# Patient Record
Sex: FEMALE | Race: WHITE | NOT HISPANIC OR LATINO | URBAN - METROPOLITAN AREA
[De-identification: names, ages, dates, MRNs, and addresses within clinical notes are randomized per-mention and may not be internally consistent; named-entity substitution may affect disease eponyms.]

---

## 2017-04-25 ENCOUNTER — INPATIENT (INPATIENT)
Facility: HOSPITAL | Age: 82
LOS: 3 days | Discharge: NURSING, REHAB OR LONG-TERM CARE | DRG: 261 | End: 2017-04-29
Attending: HOSPITALIST | Admitting: FAMILY MEDICINE
Payer: MEDICARE

## 2017-04-25 VITALS
HEART RATE: 80 BPM | HEIGHT: 62 IN | DIASTOLIC BLOOD PRESSURE: 65 MMHG | TEMPERATURE: 98 F | WEIGHT: 160.06 LBS | OXYGEN SATURATION: 100 % | SYSTOLIC BLOOD PRESSURE: 126 MMHG | RESPIRATION RATE: 18 BRPM

## 2017-04-25 DIAGNOSIS — I10 ESSENTIAL (PRIMARY) HYPERTENSION: ICD-10-CM

## 2017-04-25 DIAGNOSIS — I48.91 UNSPECIFIED ATRIAL FIBRILLATION: ICD-10-CM

## 2017-04-25 DIAGNOSIS — R55 SYNCOPE AND COLLAPSE: ICD-10-CM

## 2017-04-25 DIAGNOSIS — E11.9 TYPE 2 DIABETES MELLITUS WITHOUT COMPLICATIONS: ICD-10-CM

## 2017-04-25 LAB
ALBUMIN SERPL ELPH-MCNC: 3.7 G/DL — SIGNIFICANT CHANGE UP (ref 3.3–5.2)
ALP SERPL-CCNC: 76 U/L — SIGNIFICANT CHANGE UP (ref 40–120)
ALT FLD-CCNC: 59 U/L — HIGH
ANION GAP SERPL CALC-SCNC: 14 MMOL/L — SIGNIFICANT CHANGE UP (ref 5–17)
APPEARANCE UR: ABNORMAL
APTT BLD: 29.9 SEC — SIGNIFICANT CHANGE UP (ref 27.5–37.4)
AST SERPL-CCNC: 67 U/L — HIGH
BACTERIA # UR AUTO: ABNORMAL
BASOPHILS # BLD AUTO: 0 K/UL — SIGNIFICANT CHANGE UP (ref 0–0.2)
BASOPHILS NFR BLD AUTO: 0.3 % — SIGNIFICANT CHANGE UP (ref 0–2)
BILIRUB SERPL-MCNC: 0.8 MG/DL — SIGNIFICANT CHANGE UP (ref 0.4–2)
BILIRUB UR-MCNC: NEGATIVE — SIGNIFICANT CHANGE UP
BUN SERPL-MCNC: 25 MG/DL — HIGH (ref 8–20)
CALCIUM SERPL-MCNC: 10.8 MG/DL — HIGH (ref 8.6–10.2)
CHLORIDE SERPL-SCNC: 98 MMOL/L — SIGNIFICANT CHANGE UP (ref 98–107)
CK SERPL-CCNC: 62 U/L — SIGNIFICANT CHANGE UP (ref 25–170)
CO2 SERPL-SCNC: 27 MMOL/L — SIGNIFICANT CHANGE UP (ref 22–29)
COLOR SPEC: YELLOW — SIGNIFICANT CHANGE UP
CREAT SERPL-MCNC: 1.49 MG/DL — HIGH (ref 0.5–1.3)
DIFF PNL FLD: ABNORMAL
EOSINOPHIL # BLD AUTO: 0 K/UL — SIGNIFICANT CHANGE UP (ref 0–0.5)
EOSINOPHIL NFR BLD AUTO: 0.2 % — SIGNIFICANT CHANGE UP (ref 0–6)
EPI CELLS # UR: SIGNIFICANT CHANGE UP
GLUCOSE SERPL-MCNC: 170 MG/DL — HIGH (ref 70–115)
GLUCOSE UR QL: NEGATIVE MG/DL — SIGNIFICANT CHANGE UP
HCT VFR BLD CALC: 42.2 % — SIGNIFICANT CHANGE UP (ref 37–47)
HGB BLD-MCNC: 14.6 G/DL — SIGNIFICANT CHANGE UP (ref 12–16)
INR BLD: 1.01 RATIO — SIGNIFICANT CHANGE UP (ref 0.88–1.16)
KETONES UR-MCNC: NEGATIVE — SIGNIFICANT CHANGE UP
LEUKOCYTE ESTERASE UR-ACNC: ABNORMAL
LYMPHOCYTES # BLD AUTO: 1.3 K/UL — SIGNIFICANT CHANGE UP (ref 1–4.8)
LYMPHOCYTES # BLD AUTO: 9.7 % — LOW (ref 20–55)
MCHC RBC-ENTMCNC: 31.4 PG — HIGH (ref 27–31)
MCHC RBC-ENTMCNC: 34.6 G/DL — SIGNIFICANT CHANGE UP (ref 32–36)
MCV RBC AUTO: 90.8 FL — SIGNIFICANT CHANGE UP (ref 81–99)
MONOCYTES # BLD AUTO: 1.5 K/UL — HIGH (ref 0–0.8)
MONOCYTES NFR BLD AUTO: 11.1 % — HIGH (ref 3–10)
NEUTROPHILS # BLD AUTO: 10.4 K/UL — HIGH (ref 1.8–8)
NEUTROPHILS NFR BLD AUTO: 78.5 % — HIGH (ref 37–73)
NITRITE UR-MCNC: NEGATIVE — SIGNIFICANT CHANGE UP
PH UR: 6 — SIGNIFICANT CHANGE UP (ref 5–8)
PLATELET # BLD AUTO: 208 K/UL — SIGNIFICANT CHANGE UP (ref 150–400)
POTASSIUM SERPL-MCNC: 4.8 MMOL/L — SIGNIFICANT CHANGE UP (ref 3.5–5.3)
POTASSIUM SERPL-SCNC: 4.8 MMOL/L — SIGNIFICANT CHANGE UP (ref 3.5–5.3)
PROT SERPL-MCNC: 7.1 G/DL — SIGNIFICANT CHANGE UP (ref 6.6–8.7)
PROT UR-MCNC: 500 MG/DL
PROTHROM AB SERPL-ACNC: 11.1 SEC — SIGNIFICANT CHANGE UP (ref 9.8–12.7)
RBC # BLD: 4.65 M/UL — SIGNIFICANT CHANGE UP (ref 4.4–5.2)
RBC # FLD: 14.1 % — SIGNIFICANT CHANGE UP (ref 11–15.6)
RBC CASTS # UR COMP ASSIST: SIGNIFICANT CHANGE UP /HPF (ref 0–4)
SODIUM SERPL-SCNC: 139 MMOL/L — SIGNIFICANT CHANGE UP (ref 135–145)
SP GR SPEC: 1.01 — SIGNIFICANT CHANGE UP (ref 1.01–1.02)
TROPONIN T SERPL-MCNC: <0.01 NG/ML — SIGNIFICANT CHANGE UP (ref 0–0.06)
UROBILINOGEN FLD QL: NEGATIVE MG/DL — SIGNIFICANT CHANGE UP
WBC # BLD: 13.2 K/UL — HIGH (ref 4.8–10.8)
WBC # FLD AUTO: 13.2 K/UL — HIGH (ref 4.8–10.8)
WBC UR QL: ABNORMAL

## 2017-04-25 PROCEDURE — 99222 1ST HOSP IP/OBS MODERATE 55: CPT

## 2017-04-25 PROCEDURE — 71010: CPT | Mod: 26

## 2017-04-25 PROCEDURE — 70450 CT HEAD/BRAIN W/O DYE: CPT | Mod: 26

## 2017-04-25 PROCEDURE — 99285 EMERGENCY DEPT VISIT HI MDM: CPT

## 2017-04-25 PROCEDURE — 93010 ELECTROCARDIOGRAM REPORT: CPT

## 2017-04-25 RX ORDER — VALACYCLOVIR 500 MG/1
1000 TABLET, FILM COATED ORAL EVERY 8 HOURS
Qty: 0 | Refills: 0 | Status: DISCONTINUED | OUTPATIENT
Start: 2017-04-25 | End: 2017-04-27

## 2017-04-25 RX ORDER — DOCUSATE SODIUM 100 MG
100 CAPSULE ORAL THREE TIMES A DAY
Qty: 0 | Refills: 0 | Status: DISCONTINUED | OUTPATIENT
Start: 2017-04-25 | End: 2017-04-29

## 2017-04-25 RX ORDER — HEPARIN SODIUM 5000 [USP'U]/ML
5000 INJECTION INTRAVENOUS; SUBCUTANEOUS EVERY 12 HOURS
Qty: 0 | Refills: 0 | Status: DISCONTINUED | OUTPATIENT
Start: 2017-04-25 | End: 2017-04-29

## 2017-04-25 RX ORDER — INSULIN LISPRO 100/ML
VIAL (ML) SUBCUTANEOUS
Qty: 0 | Refills: 0 | Status: DISCONTINUED | OUTPATIENT
Start: 2017-04-25 | End: 2017-04-29

## 2017-04-25 RX ORDER — DEXTROSE 50 % IN WATER 50 %
12.5 SYRINGE (ML) INTRAVENOUS ONCE
Qty: 0 | Refills: 0 | Status: DISCONTINUED | OUTPATIENT
Start: 2017-04-25 | End: 2017-04-29

## 2017-04-25 RX ORDER — DIGOXIN 250 MCG
0.12 TABLET ORAL DAILY
Qty: 0 | Refills: 0 | Status: DISCONTINUED | OUTPATIENT
Start: 2017-04-25 | End: 2017-04-29

## 2017-04-25 RX ORDER — LOSARTAN POTASSIUM 100 MG/1
12.5 TABLET, FILM COATED ORAL DAILY
Qty: 0 | Refills: 0 | Status: DISCONTINUED | OUTPATIENT
Start: 2017-04-25 | End: 2017-04-27

## 2017-04-25 RX ORDER — GABAPENTIN 400 MG/1
300 CAPSULE ORAL THREE TIMES A DAY
Qty: 0 | Refills: 0 | Status: DISCONTINUED | OUTPATIENT
Start: 2017-04-25 | End: 2017-04-29

## 2017-04-25 RX ORDER — METOPROLOL TARTRATE 50 MG
12.5 TABLET ORAL DAILY
Qty: 0 | Refills: 0 | Status: DISCONTINUED | OUTPATIENT
Start: 2017-04-25 | End: 2017-04-29

## 2017-04-25 RX ORDER — SODIUM CHLORIDE 9 MG/ML
1000 INJECTION, SOLUTION INTRAVENOUS
Qty: 0 | Refills: 0 | Status: DISCONTINUED | OUTPATIENT
Start: 2017-04-25 | End: 2017-04-29

## 2017-04-25 RX ORDER — ASPIRIN/CALCIUM CARB/MAGNESIUM 324 MG
81 TABLET ORAL DAILY
Qty: 0 | Refills: 0 | Status: DISCONTINUED | OUTPATIENT
Start: 2017-04-25 | End: 2017-04-29

## 2017-04-25 RX ORDER — ONDANSETRON 8 MG/1
4 TABLET, FILM COATED ORAL EVERY 6 HOURS
Qty: 0 | Refills: 0 | Status: DISCONTINUED | OUTPATIENT
Start: 2017-04-25 | End: 2017-04-29

## 2017-04-25 RX ORDER — DEXTROSE 50 % IN WATER 50 %
1 SYRINGE (ML) INTRAVENOUS ONCE
Qty: 0 | Refills: 0 | Status: DISCONTINUED | OUTPATIENT
Start: 2017-04-25 | End: 2017-04-29

## 2017-04-25 RX ORDER — GLUCAGON INJECTION, SOLUTION 0.5 MG/.1ML
1 INJECTION, SOLUTION SUBCUTANEOUS ONCE
Qty: 0 | Refills: 0 | Status: DISCONTINUED | OUTPATIENT
Start: 2017-04-25 | End: 2017-04-29

## 2017-04-25 NOTE — H&P ADULT - NEGATIVE NEUROLOGICAL SYMPTOMS
no paresthesias/no loss of sensation/no transient paralysis/no loss of consciousness/no difficulty walking/no tremors/no focal seizures/no headache/no generalized seizures

## 2017-04-25 NOTE — ED PROVIDER NOTE - NS ED MD SCRIBE ATTENDING SCRIBE SECTIONS
PAST MEDICAL/SURGICAL/SOCIAL HISTORY/RESULTS/HISTORY OF PRESENT ILLNESS/VITAL SIGNS( Pullset)/REVIEW OF SYSTEMS/PHYSICAL EXAM

## 2017-04-25 NOTE — ED ADULT NURSE NOTE - PMH
Atrial fibrillation    HTN (hypertension) Atrial fibrillation    CVA (cerebral vascular accident)  left sided weakness  HTN (hypertension)

## 2017-04-25 NOTE — H&P ADULT - HISTORY OF PRESENT ILLNESS
Patient is 84 yo female with hx of Atrial fib S/P lariat procedure, and CVA presenting with dizziness, and near syncopal episodes.  Patient reports that she went to the bathroom to have BM when she started to feel dizzy, and experienced generalized weakness which resolved after several Minutes.     Offers no other complaints

## 2017-04-25 NOTE — ED PROVIDER NOTE - OBJECTIVE STATEMENT
An 85 year old female pt presents to the ED c/o weakness, dizziness. The pt was attempting to make a BM when she became dizzy, weak, and experienced a possible syncopal episode that lasted a few seconds. Pt did not have CP or SOB during this episode. 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 Pt takes Aspirin qd. She was recently diagnosed with shingles and placed on Valtrex. Her PMD is Dr. Alexander and her cardiologist is Dr. Kruger. No further complaints at this time.

## 2017-04-25 NOTE — H&P ADULT - PMH
Atrial fibrillation  s/p lariat  CVA (cerebral vascular accident)  left sided weakness  DM2 (diabetes mellitus, type 2)    HTN (hypertension)    Neuropathy

## 2017-04-25 NOTE — H&P ADULT - ATTENDING COMMENTS
Plan of care was discussed with patient, and son in great details, All questions were answered to their satisfication.  Seems to understand, and in agreement

## 2017-04-25 NOTE — ED PROVIDER NOTE - MEDICAL DECISION MAKING DETAILS
An 85 year old female pt presents to the ED c/o dizziness, weakness, syncope. Will check labs, CT head, contact cardiology.

## 2017-04-25 NOTE — ED ADULT NURSE NOTE - OBJECTIVE STATEMENT
Patient A&O to person, place, year. Denies any pain or discomfort. Son at bedside stated patient ambulated to bathroom with walker, was unable to get up from toilet on own, began complaining of weakness & stated "it seemed as if she passed out or was going to pass out." Respirations even & unlabored. Cardiac monitor in place, a-fib. Full ROM x4. Family at bedside, will monitor.

## 2017-04-25 NOTE — H&P ADULT - PROBLEM SELECTOR PLAN 1
Unclear Etiology.  Will obtain EKG, serial trop, TTE, Carotid doppler US, cardiology consult, neurology consult, and neuro checks

## 2017-04-25 NOTE — ED ADULT TRIAGE NOTE - CHIEF COMPLAINT QUOTE
pt bib ems c/o sudden onset of AMS within last half hour witnessed by family.  by EMS  as per EMS pt GCS 14. pt now GCS 15. as per EMS pt had "questionable" elevations on EKG.

## 2017-04-26 DIAGNOSIS — N28.9 DISORDER OF KIDNEY AND URETER, UNSPECIFIED: ICD-10-CM

## 2017-04-26 DIAGNOSIS — R55 SYNCOPE AND COLLAPSE: ICD-10-CM

## 2017-04-26 DIAGNOSIS — R53.1 WEAKNESS: ICD-10-CM

## 2017-04-26 LAB
ANION GAP SERPL CALC-SCNC: 15 MMOL/L — SIGNIFICANT CHANGE UP (ref 5–17)
BUN SERPL-MCNC: 26 MG/DL — HIGH (ref 8–20)
CALCIUM SERPL-MCNC: 10.7 MG/DL — HIGH (ref 8.6–10.2)
CHLORIDE SERPL-SCNC: 95 MMOL/L — LOW (ref 98–107)
CO2 SERPL-SCNC: 25 MMOL/L — SIGNIFICANT CHANGE UP (ref 22–29)
CREAT SERPL-MCNC: 1.84 MG/DL — HIGH (ref 0.5–1.3)
GLUCOSE SERPL-MCNC: 232 MG/DL — HIGH (ref 70–115)
HBA1C BLD-MCNC: 6.8 % — HIGH (ref 4–5.6)
HCT VFR BLD CALC: 40.4 % — SIGNIFICANT CHANGE UP (ref 37–47)
HGB BLD-MCNC: 13.8 G/DL — SIGNIFICANT CHANGE UP (ref 12–16)
MCHC RBC-ENTMCNC: 32 PG — HIGH (ref 27–31)
MCHC RBC-ENTMCNC: 34.2 G/DL — SIGNIFICANT CHANGE UP (ref 32–36)
MCV RBC AUTO: 93.7 FL — SIGNIFICANT CHANGE UP (ref 81–99)
PLATELET # BLD AUTO: 182 K/UL — SIGNIFICANT CHANGE UP (ref 150–400)
POTASSIUM SERPL-MCNC: 4.8 MMOL/L — SIGNIFICANT CHANGE UP (ref 3.5–5.3)
POTASSIUM SERPL-SCNC: 4.8 MMOL/L — SIGNIFICANT CHANGE UP (ref 3.5–5.3)
RBC # BLD: 4.31 M/UL — LOW (ref 4.4–5.2)
RBC # FLD: 14.1 % — SIGNIFICANT CHANGE UP (ref 11–15.6)
SODIUM SERPL-SCNC: 135 MMOL/L — SIGNIFICANT CHANGE UP (ref 135–145)
TROPONIN T SERPL-MCNC: <0.01 NG/ML — SIGNIFICANT CHANGE UP (ref 0–0.06)
WBC # BLD: 10 K/UL — SIGNIFICANT CHANGE UP (ref 4.8–10.8)
WBC # FLD AUTO: 10 K/UL — SIGNIFICANT CHANGE UP (ref 4.8–10.8)

## 2017-04-26 PROCEDURE — 99233 SBSQ HOSP IP/OBS HIGH 50: CPT

## 2017-04-26 PROCEDURE — 93880 EXTRACRANIAL BILAT STUDY: CPT | Mod: 26

## 2017-04-26 PROCEDURE — 93010 ELECTROCARDIOGRAM REPORT: CPT

## 2017-04-26 RX ORDER — CEPHALEXIN 500 MG
250 CAPSULE ORAL EVERY 12 HOURS
Qty: 0 | Refills: 0 | Status: COMPLETED | OUTPATIENT
Start: 2017-04-26 | End: 2017-04-29

## 2017-04-26 RX ORDER — CEPHALEXIN 500 MG
500 CAPSULE ORAL EVERY 12 HOURS
Qty: 0 | Refills: 0 | Status: DISCONTINUED | OUTPATIENT
Start: 2017-04-26 | End: 2017-04-26

## 2017-04-26 RX ADMIN — Medication 250 MILLIGRAM(S): at 18:37

## 2017-04-26 RX ADMIN — Medication 100 MILLIGRAM(S): at 15:31

## 2017-04-26 RX ADMIN — VALACYCLOVIR 1000 MILLIGRAM(S): 500 TABLET, FILM COATED ORAL at 15:32

## 2017-04-26 RX ADMIN — GABAPENTIN 300 MILLIGRAM(S): 400 CAPSULE ORAL at 15:31

## 2017-04-26 RX ADMIN — GABAPENTIN 300 MILLIGRAM(S): 400 CAPSULE ORAL at 06:08

## 2017-04-26 RX ADMIN — VALACYCLOVIR 1000 MILLIGRAM(S): 500 TABLET, FILM COATED ORAL at 06:08

## 2017-04-26 RX ADMIN — Medication 1: at 15:30

## 2017-04-26 RX ADMIN — VALACYCLOVIR 1000 MILLIGRAM(S): 500 TABLET, FILM COATED ORAL at 21:43

## 2017-04-26 RX ADMIN — HEPARIN SODIUM 5000 UNIT(S): 5000 INJECTION INTRAVENOUS; SUBCUTANEOUS at 18:39

## 2017-04-26 RX ADMIN — Medication 12.5 MILLIGRAM(S): at 06:07

## 2017-04-26 RX ADMIN — Medication 100 MILLIGRAM(S): at 21:44

## 2017-04-26 RX ADMIN — Medication 1: at 18:39

## 2017-04-26 RX ADMIN — LOSARTAN POTASSIUM 12.5 MILLIGRAM(S): 100 TABLET, FILM COATED ORAL at 06:07

## 2017-04-26 RX ADMIN — Medication 100 MILLIGRAM(S): at 06:07

## 2017-04-26 RX ADMIN — Medication 81 MILLIGRAM(S): at 15:31

## 2017-04-26 RX ADMIN — HEPARIN SODIUM 5000 UNIT(S): 5000 INJECTION INTRAVENOUS; SUBCUTANEOUS at 06:06

## 2017-04-26 RX ADMIN — Medication 0.12 MILLIGRAM(S): at 06:08

## 2017-04-26 RX ADMIN — GABAPENTIN 300 MILLIGRAM(S): 400 CAPSULE ORAL at 21:44

## 2017-04-26 NOTE — CONSULT NOTE ADULT - ASSESSMENT
Assessment and Plan:    HPI:  NIC SUMNER is an 85y Female with Hx of chronic Afib, s/p Lariat at [facility] due to fall risk who had experienced an episode of LOC while at the bathroom overnight, Patient had been evaluated for SSS by Dr Leon in the past. Had been markedly weak recently not eating well as per family History , denies any CP or SOB      Telemetry monitoring  Serial cardiac markers and EKgs  ILR placement today for SSS evaluation by EP  Continue present cardiac therapy, PT evaluation  Discussed with family at bedside    EL HEBERT MD, FACC, [place]

## 2017-04-26 NOTE — PROGRESS NOTE ADULT - ASSESSMENT
Patient is 86 yo female, PMH a fib, CVA,  admitted with weakness, dizziness, near syncope, brief episode LOC. . Patient is 86 yo female, PMH a fib, CVA,  admitted with weakness, dizziness, near syncope, brief episode LOC. . - syncope- possible arrythmia- loop recorder per cardio

## 2017-04-26 NOTE — ED ADULT NURSE REASSESSMENT NOTE - NS ED NURSE REASSESS COMMENT FT1
Patient A&Ox3, denies any pain or discomfort. Respirations even & unlabored. Cardiac monitor in place, a-fib. IV site C/D/I, patent, negative s/s phlebitis or infiltration. All AM meds administered as ordered, patient able to swallow pills whole, well tolerated. Will continue to monitor.

## 2017-04-26 NOTE — PROGRESS NOTE ADULT - SUBJECTIVE AND OBJECTIVE BOX
NIC SUMNER     Chief Complaint: Patient is a 85y old  Female who presents with a chief complaint of DiZziness (2017 23:48)      HPI: 	  Patient is an 86 y/o female, PMH a-fib s/p lariat procedure, and old CVA w reported residual left sided mild weakness, who was admitted from ED for further evaluation and treatment weakness, dizziness, and near syncopal episodes.  Patient reports that she went to the bathroom yesterday to have BM when she started to feel dizzy, and experienced generalized weakness which resolved after several minutes. Pt's son reported that pt "blacked out" for a few seconds. Pt assessed by me in the ERHR. Pt is pleasant and makes good conversation with some mild confusion. Pt assessed with two daughters at the bedside. Daughters report that patient lives at home with her son, state that pt has been declining lately. Pt normally ambulates with a walker, lately more unsteady. They are inquiring about Social Work and possible rehab options. Pt denies fevers/chills, chest pain, sob, palpitations, abdominal pain, nausea/vomiting, diarrhea/constipation, headache, visual changes.     PAST MEDICAL & SURGICAL HISTORY:  Neuropathy  DM2 (diabetes mellitus, type 2)  HTN (hypertension)  CVA (cerebral vascular accident): left sided weakness  Atrial fibrillation: s/p lariat  No significant past surgical history      MEDICATIONS  (STANDING):  heparin  Injectable 5000Unit(s) SubCutaneous every 12 hours  insulin lispro (HumaLOG) corrective regimen sliding scale  SubCutaneous three times a day before meals  dextrose 5%. 1000milliLiter(s) IV Continuous <Continuous>  dextrose 50% Injectable 12.5Gram(s) IV Push once  docusate sodium 100milliGRAM(s) Oral three times a day  aspirin enteric coated 81milliGRAM(s) Oral daily  losartan 12.5milliGRAM(s) Oral daily  digoxin     Tablet 0.125milliGRAM(s) Oral daily  gabapentin 300milliGRAM(s) Oral three times a day  valACYclovir 1000milliGRAM(s) Oral every 8 hours  metoprolol succinate ER 12.5milliGRAM(s) Oral daily      Allergies: No Known Allergies      REVIEW OF SYSTEMS:    CONSTITUTIONAL: No fever  ENMT:  No difficulty hearing, tinnitus, vertigo; No sinus or throat pain  NECK: No pain or stiffness  RESPIRATORY: No cough, wheezing, chills or hemoptysis; No shortness of breath  CARDIOVASCULAR: No chest pain, palpitations, dizziness, or leg swelling  GASTROINTESTINAL: No abdominal or epigastric pain. No nausea, vomiting, or hematemesis; No diarrhea or constipation. No melena or hematochezia.  GENITOURINARY: No dysuria, frequency, hematuria, or incontinence  NEUROLOGICAL: No headaches, memory loss, loss of strength, numbness, or tremors  SKIN: No itching, burning, rashes, or lesions   MUSCULOSKELETAL: No joint pain or swelling; No muscle, back, or extremity pain  PSYCHIATRIC: No depression, anxiety, mood swings, or difficulty sleeping    Vital Signs Last 24 Hrs  T(C): 36.7, Max: 36.8 ( @ 03:07)  T(F): 98, Max: 98.2 ( @ 03:07)  HR: 70 (70 - 80)  BP: 126/63 (126/63 - 141/75)  BP(mean): 85 (85 - 85)  RR: 18 (18 - 20)  SpO2: 93% (93% - 100%)        CAPILLARY BLOOD GLUCOSE  200 (2017 12:15)  166 (2017 08:57)    LABS:                        13.8   10.0  )-----------( 182      ( 2017 11:27 )             40.4         135  |  95<L>  |  26.0<H>  ----------------------------<  232<H>  4.8   |  25.0  |  1.84<H>    Ca    10.7<H>      2017 11:27    TPro  7.1  /  Alb  3.7  /  TBili  0.8  /  DBili  x   /  AST  67<H>  /  ALT  59<H>  /  AlkPhos  76  04-25    PT/INR - ( 2017 21:03 )   PT: 11.1 sec;   INR: 1.01 ratio         PTT - ( 2017 21:03 )  PTT:29.9 sec  Urinalysis Basic - ( 2017 21:11 )    Color: Yellow / Appearance: Cloudy / S.010 / pH: x  Gluc: x / Ketone: Negative  / Bili: Negative / Urobili: Negative mg/dL   Blood: x / Protein: 500 mg/dL / Nitrite: Negative   Leuk Esterase: Small / RBC: 0-2 /HPF / WBC 6-10   Sq Epi: x / Non Sq Epi: Occasional / Bacteria: Moderate        RADIOLOGY & ADDITIONAL TESTS:    17 CT HEAD  IMPRESSION: No acute intracranial hemorrhage, mass effect, or midline   shift.    Microvascular disease.    Encephalomalacia and gliosis within the high right posterior frontal lobe   related to prior insult.    17 US DPLX CAROTIDS   IMPRESSION:Moderate bilateral heterogeneous plaque.    50-69% stenosis right mid/proximal ICA.    Measurement of carotid stenosis is based on velocity parameters that   correlate the residual internal carotid diameter with that of the more   distal vessel in accordance with a method such as the North American   Symptomatic Carotid Endarterectomy Trial (NASCET).      PHYSICAL EXAM:    General: elderly and frail appearing, NAD, sitting up in bed, well groomed in nightgown  Eyes: PERRLA, EOM intact  Neck: Supple and symmetrical, no JVD  CV: RRR, no m/r/g  Lungs: CTA b/l, no use of accessory muscles, no wheezes, rales, rhonchi  Skin: warm, dry, no rashes  Psych: normal mood/affect  Abdomen: Normal BS, soft, NT/ND  Extremities: no edema, cyanosis  Musculoskeletal: good strength b/l UE/LE, normal ROM, no joint swelling  Neuro: A & O X 2-3, does not know year but does know president, CN 2-12 grossly intact, no sensory or motor deficit.     Syncope and collapse: SYNCOPE AND COLLAPSE  Unknown h/o HF  No pertinent family history in first degree relatives  MEWS Score: 5 (2017 06:01)  Neuropathy  DM2 (diabetes mellitus, type 2)  HTN (hypertension)  CVA (cerebral vascular accident): left sided weakness  Atrial fibrillation: s/p lariat  HTN (hypertension)  Syncope  HTN (hypertension): HTN (hypertension)  Atrial fibrillation: Atrial fibrillation  DM2 (diabetes mellitus, type 2): DM2 (diabetes mellitus, type 2)  Near syncope: Near syncope  No significant past surgical history  MED EVAL: MED EVAL  Atrial fibrillation NIC SUMNER     Chief Complaint: Patient is a 85y old  Female who presents with a chief complaint of DiZziness (2017 23:48)      HPI: 	  Patient is an 86 y/o female, PMH a-fib s/p lariat procedure, and old CVA w reported residual left sided mild weakness, who was admitted from ED for further evaluation and treatment weakness, dizziness, and near syncopal episodes.  Patient reports that she went to the bathroom yesterday to have BM when she started to feel dizzy, and experienced generalized weakness which resolved after several minutes. Pt's son reported that pt "blacked out" for a few seconds. Pt assessed by me in the ER. Pt is pleasant and makes good conversation with some mild confusion. Pt assessed with two daughters at the bedside. Daughters report that patient lives at home with her son, state that pt has been declining lately. Pt normally ambulates with a walker, lately more unsteady. They are inquiring about Social Work and possible rehab options. Pt denies fevers/chills, chest pain, sob, palpitations, abdominal pain, nausea/vomiting, diarrhea/constipation, headache, visual changes.    S- still with significant weakness , discussed with daughter at bedside     PAST MEDICAL & SURGICAL HISTORY:  Neuropathy  DM2 (diabetes mellitus, type 2)  HTN (hypertension)  CVA (cerebral vascular accident): left sided weakness  Atrial fibrillation: s/p lariat  No significant past surgical history      MEDICATIONS  (STANDING):  heparin  Injectable 5000Unit(s) SubCutaneous every 12 hours  insulin lispro (HumaLOG) corrective regimen sliding scale  SubCutaneous three times a day before meals  dextrose 5%. 1000milliLiter(s) IV Continuous <Continuous>  dextrose 50% Injectable 12.5Gram(s) IV Push once  docusate sodium 100milliGRAM(s) Oral three times a day  aspirin enteric coated 81milliGRAM(s) Oral daily  losartan 12.5milliGRAM(s) Oral daily  digoxin     Tablet 0.125milliGRAM(s) Oral daily  gabapentin 300milliGRAM(s) Oral three times a day  valACYclovir 1000milliGRAM(s) Oral every 8 hours  metoprolol succinate ER 12.5milliGRAM(s) Oral daily      Allergies: No Known Allergies      REVIEW OF SYSTEMS:    CONSTITUTIONAL: No fever  ENMT:  No difficulty hearing, tinnitus, vertigo; No sinus or throat pain  NECK: No pain or stiffness  RESPIRATORY: No cough, wheezing, chills or hemoptysis; No shortness of breath  CARDIOVASCULAR: No chest pain, palpitations, dizziness, or leg swelling  GASTROINTESTINAL: No abdominal or epigastric pain. No nausea, vomiting, or hematemesis; No diarrhea or constipation. No melena or hematochezia.  GENITOURINARY: No dysuria, frequency, hematuria, or incontinence  NEUROLOGICAL: No headaches, memory loss, loss of strength, numbness, or tremors  SKIN: No itching, burning, rashes, or lesions   MUSCULOSKELETAL: No joint pain or swelling; No muscle, back, or extremity pain  PSYCHIATRIC: No depression, anxiety, mood swings, or difficulty sleeping    Vital Signs Last 24 Hrs  T(C): 36.7, Max: 36.8 ( @ 03:07)  T(F): 98, Max: 98.2 ( @ 03:07)  HR: 70 (70 - 80)  BP: 126/63 (126/63 - 141/75)  BP(mean): 85 (85 - 85)  RR: 18 (18 - 20)  SpO2: 93% (93% - 100%)        CAPILLARY BLOOD GLUCOSE  200 (2017 12:15)  166 (2017 08:57)    LABS:                        13.8   10.0  )-----------( 182      ( 2017 11:27 )             40.4         135  |  95<L>  |  26.0<H>  ----------------------------<  232<H>  4.8   |  25.0  |  1.84<H>    Ca    10.7<H>      2017 11:27    TPro  7.1  /  Alb  3.7  /  TBili  0.8  /  DBili  x   /  AST  67<H>  /  ALT  59<H>  /  AlkPhos  76  25    PT/INR - ( 2017 21:03 )   PT: 11.1 sec;   INR: 1.01 ratio         PTT - ( 2017 21:03 )  PTT:29.9 sec  Urinalysis Basic - ( 2017 21:11 )    Color: Yellow / Appearance: Cloudy / S.010 / pH: x  Gluc: x / Ketone: Negative  / Bili: Negative / Urobili: Negative mg/dL   Blood: x / Protein: 500 mg/dL / Nitrite: Negative   Leuk Esterase: Small / RBC: 0-2 /HPF / WBC 6-10   Sq Epi: x / Non Sq Epi: Occasional / Bacteria: Moderate        RADIOLOGY & ADDITIONAL TESTS:    17 CT HEAD  IMPRESSION: No acute intracranial hemorrhage, mass effect, or midline   shift.    Microvascular disease.    Encephalomalacia and gliosis within the high right posterior frontal lobe   related to prior insult.    17 US DPLX CAROTIDS   IMPRESSION:Moderate bilateral heterogeneous plaque.    50-69% stenosis right mid/proximal ICA.    Measurement of carotid stenosis is based on velocity parameters that   correlate the residual internal carotid diameter with that of the more   distal vessel in accordance with a method such as the North American   Symptomatic Carotid Endarterectomy Trial (NASCET).      PHYSICAL EXAM:    General: elderly and frail appearing, NAD, sitting up in bed, well groomed in nightgown  Eyes: PERRLA, EOM intact  Neck: Supple and symmetrical, no JVD  CV: RRR, no m/r/g  Lungs: CTA b/l, no use of accessory muscles, no wheezes, rales, rhonchi  Skin: warm, dry, no rashes  Psych: normal mood/affect  Abdomen: Normal BS, soft, NT/ND  Extremities: no edema, cyanosis  Musculoskeletal: good strength b/l UE/LE, normal ROM, no joint swelling  Neuro: A & O X 2-3, does not know year but does know president, CN 2-12 grossly intact, no sensory or motor deficit.     Syncope and collapse: SYNCOPE AND COLLAPSE  Unknown h/o HF  No pertinent family history in first degree relatives  MEWS Score: 5 (2017 06:01)  Neuropathy  DM2 (diabetes mellitus, type 2)  HTN (hypertension)  CVA (cerebral vascular accident): left sided weakness  Atrial fibrillation: s/p lariat  HTN (hypertension)  Syncope  HTN (hypertension): HTN (hypertension)  Atrial fibrillation: Atrial fibrillation  DM2 (diabetes mellitus, type 2): DM2 (diabetes mellitus, type 2)  Near syncope: Near syncope  No significant past surgical history  MED EVAL: MED EVAL  Atrial fibrillation

## 2017-04-26 NOTE — ED ADULT NURSE REASSESSMENT NOTE - NS ED NURSE REASSESS COMMENT FT1
Pt in no acute distress, vss, fall precautions in place, will continue to monitor. Pt in no acute distress, vss, no facial droop + PERRL. +LAYTON,  fall precautions in place, will continue to monitor.

## 2017-04-26 NOTE — ED ADULT NURSE REASSESSMENT NOTE - NS ED NURSE REASSESS COMMENT FT1
Patient A&Ox3, denies any pain or discomfort, respirations even & unlabored. Cardiac monitor in place, a-fib. Requested something to eat, request granted. Needs assist with feeding secondary to chronic weakness in left arm , RN assisted, patient tolerated well. Will continue to monitor.

## 2017-04-26 NOTE — PROGRESS NOTE ADULT - PROBLEM SELECTOR PLAN 1
CT head negative. Carotid doppler results appreciated, no stenosis. Cardiology consult appreciated. Plan is telemetry monitoring, follow serial Troponins and for ILR placement today for SSS by EP. Continue present cardiac meds and obtain PT evaluation. Neuro consult called, Dr. Johnston, they see her as outpatient. Cont neuro checks q8h. CT head negative. Carotid doppler results appreciated, no stenosis. Cardiology consult appreciated. Plan is telemetry monitoring, follow serial Troponin and for ILR placement today for SSS by EP. Continue present cardiac meds and obtain PT evaluation. Neuro consult called, Dr. Johnston, they see her as outpatient. Cont neuro checks q8h.

## 2017-04-26 NOTE — ED ADULT NURSE REASSESSMENT NOTE - NS ED NURSE REASSESS COMMENT FT1
Report received from outgoing RN Delphine Wilcox at change of shift and assumed care of pt at that time. Multiple codes called in ED and at approx 0900 pt awake, alert, no acute distress, Afib on monitor and noted to have history of same, talking to family and Dr Welch at bedside, offers no complaints, per NA , will medicate as ordered. Fall precautions in place.

## 2017-04-26 NOTE — PATIENT PROFILE ADULT. - FALL HARM RISK
other/age(85 years old or older)/coagulation(Bleeding disorder R/T clinical cond/anti-coags)/weakness more on the Rside than L-s/p CVA

## 2017-04-27 LAB — TROPONIN T SERPL-MCNC: <0.01 NG/ML — SIGNIFICANT CHANGE UP (ref 0–0.06)

## 2017-04-27 PROCEDURE — 99233 SBSQ HOSP IP/OBS HIGH 50: CPT

## 2017-04-27 RX ADMIN — VALACYCLOVIR 1000 MILLIGRAM(S): 500 TABLET, FILM COATED ORAL at 13:20

## 2017-04-27 RX ADMIN — Medication 0.12 MILLIGRAM(S): at 05:43

## 2017-04-27 RX ADMIN — Medication 100 MILLIGRAM(S): at 21:22

## 2017-04-27 RX ADMIN — Medication 100 MILLIGRAM(S): at 13:17

## 2017-04-27 RX ADMIN — LOSARTAN POTASSIUM 12.5 MILLIGRAM(S): 100 TABLET, FILM COATED ORAL at 05:44

## 2017-04-27 RX ADMIN — GABAPENTIN 300 MILLIGRAM(S): 400 CAPSULE ORAL at 13:17

## 2017-04-27 RX ADMIN — HEPARIN SODIUM 5000 UNIT(S): 5000 INJECTION INTRAVENOUS; SUBCUTANEOUS at 21:22

## 2017-04-27 RX ADMIN — HEPARIN SODIUM 5000 UNIT(S): 5000 INJECTION INTRAVENOUS; SUBCUTANEOUS at 11:22

## 2017-04-27 RX ADMIN — Medication 250 MILLIGRAM(S): at 05:43

## 2017-04-27 RX ADMIN — GABAPENTIN 300 MILLIGRAM(S): 400 CAPSULE ORAL at 05:43

## 2017-04-27 RX ADMIN — Medication 100 MILLIGRAM(S): at 05:43

## 2017-04-27 RX ADMIN — GABAPENTIN 300 MILLIGRAM(S): 400 CAPSULE ORAL at 21:22

## 2017-04-27 RX ADMIN — Medication 12.5 MILLIGRAM(S): at 05:43

## 2017-04-27 RX ADMIN — Medication 250 MILLIGRAM(S): at 17:38

## 2017-04-27 RX ADMIN — Medication 81 MILLIGRAM(S): at 11:22

## 2017-04-27 RX ADMIN — VALACYCLOVIR 1000 MILLIGRAM(S): 500 TABLET, FILM COATED ORAL at 05:43

## 2017-04-27 RX ADMIN — Medication 2: at 13:16

## 2017-04-27 NOTE — PROGRESS NOTE ADULT - SUBJECTIVE AND OBJECTIVE BOX
NIC SUMNER Patient is a 85y old  Female who presents with a chief complaint of DiZziness (2017 23:48)     HPI:  Patient is 84 yo female with hx of Atrial fib S/P lariat procedure, and CVA presenting with dizziness, and near syncopal episodes.  Patient reports that she went to the bathroom to have BM when she started to feel dizzy, and experienced generalized weakness which resolved after several Minutes.     Offers no other complaints (2017 23:48)    The patient was seen and evaluated feels better no new complaints, awaits PT, now post loop recorder , admitted with syncope and collapse   The patient is in no acute distress.  Denied any fever chest pain, palpitations, shortness of breath, abdominal pain, fever, dysuria, cough, edema       I&O's Summary    I & Os for current day (as of 2017 13:30)  =============================================  IN: 120 ml / OUT: 0 ml / NET: 120 ml    Allergies    No Known Allergies    Intolerances      HEALTH ISSUES - PROBLEM Dx:  Renal insufficiency: Renal insufficiency  Weakness: Weakness  Syncope: Syncope  HTN (hypertension): HTN (hypertension)  Atrial fibrillation: Atrial fibrillation  DM2 (diabetes mellitus, type 2): DM2 (diabetes mellitus, type 2)  Near syncope: Near syncope        PAST MEDICAL & SURGICAL HISTORY:  Neuropathy  DM2 (diabetes mellitus, type 2)  HTN (hypertension)  CVA (cerebral vascular accident): left sided weakness  Atrial fibrillation: s/p lariat  No significant past surgical history          Vital Signs Last 24 Hrs  T(C): 36.6, Max: 36.8 ( @ 21:42)  T(F): 97.9, Max: 98.2 (- @ 21:42)  HR: 74 (65 - 85)  BP: 118/68 (98/66 - 126/78)  BP(mean): --  RR: 18 (16 - 20)  SpO2: 98% (97% - 98%)T(C): 36.6, Max: 36.8 (- @ 21:42)  HR: 74 (65 - 85)  BP: 118/68 (98/66 - 126/78)  RR: 18 (16 - 20)  SpO2: 98% (97% - 98%)  Wt(kg): --    PHYSICAL EXAM:    GENERAL: NAD, elderly female   HEAD:  Atraumatic, Normocephalic  EYES: EOMI, PERRLA, conjunctiva and sclera clear  NECK: Supple, No JVD, Normal thyroid  NERVOUS SYSTEM:  Alert & Oriented X3,  Moves upper and lower extremities; CNS-II-XII, weakness both arms and legs , normally walks with walker   CHEST/LUNG: Clear to auscultation bilaterally; No rales, rhonchi, wheezing,   HEART: Regular rate and rhythm; No murmurs,   ABDOMEN: Soft, Nontender, Nondistended; Bowel sounds present  EXTREMITIES:  Peripheral Pulses, No  cyanosis, or edema  SKIN: No rashes or lesions  psychiatry- mood and affect appropriate     heparin  Injectable 5000Unit(s) SubCutaneous every 12 hours  insulin lispro (HumaLOG) corrective regimen sliding scale  SubCutaneous three times a day before meals  dextrose 5%. 1000milliLiter(s) IV Continuous <Continuous>  dextrose Gel 1Dose(s) Oral once PRN  dextrose 50% Injectable 12.5Gram(s) IV Push once  glucagon  Injectable 1milliGRAM(s) IntraMuscular once PRN  ondansetron Injectable 4milliGRAM(s) IV Push every 6 hours PRN  docusate sodium 100milliGRAM(s) Oral three times a day  aspirin enteric coated 81milliGRAM(s) Oral daily  losartan 12.5milliGRAM(s) Oral daily  digoxin     Tablet 0.125milliGRAM(s) Oral daily  gabapentin 300milliGRAM(s) Oral three times a day  valACYclovir 1000milliGRAM(s) Oral every 8 hours  metoprolol succinate ER 12.5milliGRAM(s) Oral daily  cephalexin 250milliGRAM(s) Oral every 12 hours      LABS:                          13.8   10.0  )-----------( 182      ( 2017 11:27 )             40.4     04-    135  |  95<L>  |  26.0<H>  ----------------------------<  232<H>  4.8   |  25.0  |  1.84<H>    Ca    10.7<H>      2017 11:27    TPro  7.1  /  Alb  3.7  /  TBili  0.8  /  DBili  x   /  AST  67<H>  /  ALT  59<H>  /  AlkPhos  76  04-25    LIVER FUNCTIONS - ( 2017 21:03 )  Alb: 3.7 g/dL / Pro: 7.1 g/dL / ALK PHOS: 76 U/L / ALT: 59 U/L / AST: 67 U/L / GGT: x           PT/INR - ( 2017 21:03 )   PT: 11.1 sec;   INR: 1.01 ratio         PTT - ( 2017 21:03 )  PTT:29.9 sec  CARDIAC MARKERS ( 2017 07:51 )  x     / <0.01 ng/mL / x     / x     / x      CARDIAC MARKERS ( 2017 11:27 )  x     / <0.01 ng/mL / x     / x     / x      CARDIAC MARKERS ( 2017 21:03 )  x     / <0.01 ng/mL / 62 U/L / x     / x          Urinalysis Basic - ( 2017 21:11 )    Color: Yellow / Appearance: Cloudy / S.010 / pH: x  Gluc: x / Ketone: Negative  / Bili: Negative / Urobili: Negative mg/dL   Blood: x / Protein: 500 mg/dL / Nitrite: Negative   Leuk Esterase: Small / RBC: 0-2 /HPF / WBC 6-10   Sq Epi: x / Non Sq Epi: Occasional / Bacteria: Moderate      CAPILLARY BLOOD GLUCOSE  228 (2017 12:16)  129 (2017 09:01)  175 (2017 18:04)      RADIOLOGY & ADDITIONAL TESTS:      Consultant notes reviewed    Case discussed with consultant/provider/ family /patient

## 2017-04-27 NOTE — CONSULT NOTE ADULT - SUBJECTIVE AND OBJECTIVE BOX
[facility], THE HEART CENTER                                   [address] [place] [address]                                                      PHONE: [phone]                                                         FAX: [phone]  [email or web address]  ---------------------------------------------------------------------------------------------------------------------------------    Reason for Consult: Syncope    HPI:  NIC SUMNER is an 85y Female with Hx of chronic Afib, s/p Lariat at [facility] due to fall risk who had experienced an episode of LOC while at the bathroom overnight, Patient had been evaluated for SSS by Dr Leon in the past. Had been markedly weak recently not eating well as per family History , denies any CP or SOB    PAST MEDICAL & SURGICAL HISTORY:  Neuropathy  DM2 (diabetes mellitus, type 2)  HTN (hypertension)  CVA (cerebral vascular accident): left sided weakness  Atrial fibrillation: s/p lariat  No significant past surgical history      No Known Allergies      MEDICATIONS  (STANDING):  heparin  Injectable 5000Unit(s) SubCutaneous every 12 hours  insulin lispro (HumaLOG) corrective regimen sliding scale  SubCutaneous three times a day before meals  dextrose 5%. 1000milliLiter(s) IV Continuous <Continuous>  dextrose 50% Injectable 12.5Gram(s) IV Push once  docusate sodium 100milliGRAM(s) Oral three times a day  aspirin enteric coated 81milliGRAM(s) Oral daily  losartan 12.5milliGRAM(s) Oral daily  digoxin     Tablet 0.125milliGRAM(s) Oral daily  gabapentin 300milliGRAM(s) Oral three times a day  valACYclovir 1000milliGRAM(s) Oral every 8 hours  metoprolol succinate ER 12.5milliGRAM(s) Oral daily    MEDICATIONS  (PRN):  dextrose Gel 1Dose(s) Oral once PRN Blood Glucose LESS THAN 70 milliGRAM(s)/deciliter  glucagon  Injectable 1milliGRAM(s) IntraMuscular once PRN Glucose LESS THAN 70 milligrams/deciliter  ondansetron Injectable 4milliGRAM(s) IV Push every 6 hours PRN Nausea      Social History:  Cigarettes:                    Alchohol:                 Illicit Drug Abuse:      REVIEW OF SYSTEMS:    Constitutional: No fever, weight loss or fatigue  Eyes: No eye pain, visual disturbances, or discharge  ENMT:  No difficulty hearing, tinnitus, vertigo; No sinus or throat pain  Neck: No pain or stiffness  Respiratory: No cough, wheezing, chills or hemoptysis  Cardiovascular: No chest pain, palpitations, shortness of breath, dizziness or leg swelling  Gastrointestinal: No abdominal or epigastric pain. No nausea, vomiting or hematemesis; No diarrhea or constipation. No melena or hematochezia.  Genitourinary: No dysuria, frequency, hematuria or incontinence  Rectal: No pain, hemorrhoids or incontinence  Neurological: LOC  Skin: No itching, burning, rashes or lesions   Lymph Nodes: No enlarged glands  Endocrine: No heat or cold intolerance; No hair loss  Musculoskeletal: No joint pain or swelling; No muscle, back or extremity pain  Psychiatric: No depression, anxiety, mood swings or difficulty sleeping  Heme/Lymph: No easy bruising or bleeding gums  Allergy and Immunologic: No hives or eczema    Vital Signs Last 24 Hrs  T(C): 36.7, Max: 36.8 ( @ 03:07)  T(F): 98.1, Max: 98.2 ( @ 03:07)  HR: 79 (73 - 80)  BP: 141/75 (126/65 - 141/75)  BP(mean): 85 (85 - 85)  RR: 18 (18 - 20)  SpO2: 100% (98% - 100%)  ICU Vital Signs Last 24 Hrs  NICPEARL SUMNER  I&O's Detail    I&O's Summary    Drug Dosing Weight  NIC SUMNER      PHYSICAL EXAM:  General: Appears well developed, well nourished alert and cooperative.  HEENT: Head; normocephalic, atraumatic.  Eyes: Pupils reactive, cornea wnl.  Neck: Supple, no nodes adenopathy, no NVD or carotid bruit or thyromegaly.  CARDIOVASCULAR: Normal S1 and S2, No murmur, rub, gallop or lift.   LUNGS: No rales, rhonchi or wheeze. Normal breath sounds bilaterally.  ABDOMEN: Soft, nontender without mass or organomegaly. bowel sounds normoactive.  EXTREMITIES: No clubbing, cyanosis or edema. Distal pulses wnl.   SKIN: warm and dry with normal turgor.  NEURO: Alert/oriented x 3/normal motor exam. No pathologic reflexes.    PSYCH: normal affect.        LABS:                        14.6   13.2  )-----------( 208      ( 2017 21:03 )             42.2         139  |  98  |  25.0<H>  ----------------------------<  170<H>  4.8   |  27.0  |  1.49<H>    Ca    10.8<H>      2017 21:03    TPro  7.1  /  Alb  3.7  /  TBili  0.8  /  DBili  x   /  AST  67<H>  /  ALT  59<H>  /  AlkPhos  76      NICPEARL SUMNER  CARDIAC MARKERS ( 2017 21:03 )  x     / <0.01 ng/mL / 62 U/L / x     / x          PT/INR - ( 2017 21:03 )   PT: 11.1 sec;   INR: 1.01 ratio         PTT - ( 2017 21:03 )  PTT:29.9 sec  Urinalysis Basic - ( 2017 21:11 )    Color: Yellow / Appearance: Cloudy / S.010 / pH: x  Gluc: x / Ketone: Negative  / Bili: Negative / Urobili: Negative mg/dL   Blood: x / Protein: 500 mg/dL / Nitrite: Negative   Leuk Esterase: Small / RBC: 0-2 /HPF / WBC 6-10   Sq Epi: x / Non Sq Epi: Occasional / Bacteria: Moderate        RADIOLOGY & ADDITIONAL STUDIES:    INTERPRETATION OF TELEMETRY (personally reviewed):    ECG: AFIB CVR RBBB      ACTIVE PROBLEMS:  HEALTH ISSUES - PROBLEM Dx:  HTN (hypertension): HTN (hypertension)  Atrial fibrillation: Atrial fibrillation  DM2 (diabetes mellitus, type 2): DM2 (diabetes mellitus, type 2)  Near syncope: Near syncope
Patient is a 85y old  Female who presents with a chief complaint of Dizziness (2017 23:48)  was in BR, tried to get up but ? passed out per daughter- ? syncope  Now Creat rising to 1.84; no prior Hx CKD    HPI:  Patient is 84 yo female with hx of Atrial fib S/P lariat procedure, and CVA presenting with dizziness, and near syncopal episodes.  Patient reports that she went to the bathroom to have BM when she started to feel dizzy, and experienced generalized weakness which resolved after several Minutes.     Offers no other complaints (2017 23:48)  Has been on Valtrex - exact reason ? - possible for rash    PAST MEDICAL & SURGICAL HISTORY:  Neuropathy  DM2 (diabetes mellitus, type 2)- 3 years  HTN (hypertension)- > 25 yrs  CVA (cerebral vascular accident): left sided weakness  Atrial fibrillation: s/p lariat  No significant past surgical history      FAMILY HISTORY:  No pertinent family history in first degree relatives      Social History: Never smoked; no alc    Allergies    No Known Allergies    Intolerances        REVIEW OF SYSTEMS:    CONSTITUTIONAL: No fever, weight loss, or fatigue  EYES: No eye pain or visual disturbances,   RESPIRATORY: No cough, hemoptysis; or SOB  CARDIOVASCULAR: No chest pain, palpitations,  leg swelling  GASTROINTESTINAL: No abdominal , NVD or GIB  GENITOURINARY: No UTI sx  NEUROLOGICAL: No headaches/wk/numbness  MUSCULOSKELETAL: No joint pain or swelling; No muscle, back, or extremity pain      Vital Signs Last 24 Hrs  T(C): 36.6, Max: 36.8 (04-26 @ 21:42)  T(F): 97.9, Max: 98.2 (04- @ 21:42)  HR: 74 (74 - 85)  BP: 118/68 (118/68 - 126/78)  BP(mean): --  RR: 18 (16 - 18)  SpO2: 98% (98% - 98%)    PHYSICAL EXAM:    GENERAL: NAD,   EYES:  conjunctiva and sclera clear  NECK: Supple, No JVD/Bruit, Normal thyroid  NERVOUS SYSTEM:  A/O x3,   CHEST/LUNG:CTA No rales, rhonchi,  or rubs  HEART: Regular rate and rhythm; No murmurs, rubs, or gallops  ABDOMEN: Soft, NT/ND BS+  EXTREMITIES:  + Peripheral Pulses, No C/C/E  SKIN: No rashes or lesions      LABS:                        13.8   10.0  )-----------( 182      ( 2017 11:27 )             40.4         135  |  95<L>  |  26.0<H>  ----------------------------<  232<H>  4.8   |  25.0  |  1.84<H>    Ca    10.7<H>      2017 11:27    TPro  7.1  /  Alb  3.7  /  TBili  0.8  /  DBili  x   /  AST  67<H>  /  ALT  59<H>  /  AlkPhos  76      PT/INR - ( 2017 21:03 )   PT: 11.1 sec;   INR: 1.01 ratio         PTT - ( 2017 21:03 )  PTT:29.9 sec  Urinalysis Basic - ( 2017 21:11 )    Color: Yellow / Appearance: Cloudy / S.010 / pH: x  Gluc: x / Ketone: Negative  / Bili: Negative / Urobili: Negative mg/dL   Blood: x / Protein: 500 mg/dL / Nitrite: Negative   Leuk Esterase: Small / RBC: 0-2 /HPF / WBC 6-10   Sq Epi: x / Non Sq Epi: Occasional / Bacteria: Moderate          RADIOLOGY & ADDITIONAL TESTS:    MEDICATIONS  (STANDING):  heparin  Injectable  insulin lispro (HumaLOG) corrective regimen sliding scale  dextrose 5%.  dextrose Gel PRN  dextrose 50% Injectable  glucagon  Injectable PRN  ondansetron Injectable PRN  docusate sodium  aspirin enteric coated  digoxin     Tablet  gabapentin  metoprolol succinate ER  cephalexin
[facility]                                 [address]                                  [address]                                        [place], [place], [address]                                             [phone]    HISTORY:    The patient is a 85y Female who came to ED because of syncopal event.  She had finished having a BM, and was trying to get up from the toilet.  She felt too weakto get up on own and son came in to help.  When he was helping her, she said she felt ill, was dizzy/lightheaded, became pale and has a few seconds of LOC.  Her son said there was no convulsive activity.  Neuro eval requested    PAST MEDICAL & SURGICAL HISTORY:  Neuropathy  DM2 (diabetes mellitus, type 2)  HTN (hypertension)  CVA (cerebral vascular accident): left sided weakness  Atrial fibrillation: s/p lariat  No significant past surgical history      MEDICATIONS  (STANDING):  heparin  Injectable 5000Unit(s) SubCutaneous every 12 hours  insulin lispro (HumaLOG) corrective regimen sliding scale  SubCutaneous three times a day before meals  dextrose 5%. 1000milliLiter(s) IV Continuous <Continuous>  dextrose 50% Injectable 12.5Gram(s) IV Push once  docusate sodium 100milliGRAM(s) Oral three times a day  aspirin enteric coated 81milliGRAM(s) Oral daily  losartan 12.5milliGRAM(s) Oral daily  digoxin     Tablet 0.125milliGRAM(s) Oral daily  gabapentin 300milliGRAM(s) Oral three times a day  valACYclovir 1000milliGRAM(s) Oral every 8 hours  metoprolol succinate ER 12.5milliGRAM(s) Oral daily  cephalexin 250milliGRAM(s) Oral every 12 hours    MEDICATIONS  (PRN):  dextrose Gel 1Dose(s) Oral once PRN Blood Glucose LESS THAN 70 milliGRAM(s)/deciliter  glucagon  Injectable 1milliGRAM(s) IntraMuscular once PRN Glucose LESS THAN 70 milligrams/deciliter  ondansetron Injectable 4milliGRAM(s) IV Push every 6 hours PRN Nausea      Allergies    No Known Allergies    Intolerances        SOCIAL HISTORY:  no tob/etoh/drugs    FAMILY HISTORY:  No pertinent family history in first degree relatives      ROS:  The patient denies fevers or weight changes.  Denies headache or dizziness.  Denies chest pain.  Denies shortness of breath.  Denies abdominal pain, nausea, or vomiting.  Denies change in urinary pattern.  Denies rash.  Denies recent mood changes.    Exam:  Vital Signs Last 24 Hrs  T(C): 36.7, Max: 36.8 (04-26 @ 21:42)  T(F): 98, Max: 98.2 (04-26 @ 21:42)  HR: 85 (65 - 85)  BP: 118/72 (98/66 - 126/78)  BP(mean): --  RR: 16 (16 - 20)  SpO2: 98% (93% - 98%)  General: NAD    Mental status: The patient is awake, alert, and fully oriented. There is no aphasia.    Cranial nerves: . Pupils react Symmetrically to light. There is no visual field deficit to confrontation. Extraocular motion is full with no nystagmus. There is no ptosis. Facial sensation is intact. Facial musculature is symmetric. Palate elevates symmetrically. Tongue is midline.    Motor: There is normal bulk and tone.  mild diffuse weakness    Sensation: Intact to light touch in 4 extremities.    Reflexes: diminished  throughout and plantar responses are flexor.    Cerebellar: There is no dysmetria on finger to nose testing.    LABS:                         13.8   10.0  )-----------( 182      ( 26 Apr 2017 11:27 )             40.4       04-26    135  |  95<L>  |  26.0<H>  ----------------------------<  232<H>  4.8   |  25.0  |  1.84<H>    Ca    10.7<H>      26 Apr 2017 11:27    TPro  7.1  /  Alb  3.7  /  TBili  0.8  /  DBili  x   /  AST  67<H>  /  ALT  59<H>  /  AlkPhos  76  04-25      PT/INR - ( 25 Apr 2017 21:03 )   PT: 11.1 sec;   INR: 1.01 ratio         PTT - ( 25 Apr 2017 21:03 )  PTT:29.9 sec    RADIOLOGY & ADDITIONAL STUDIES:  CT shows old right frontal CVA, no acute CVA, mass or bleed  carotid duplex 50-69% Right ICA stenosis

## 2017-04-27 NOTE — PHYSICAL THERAPY INITIAL EVALUATION ADULT - CRITERIA FOR SKILLED THERAPEUTIC INTERVENTIONS
therapy frequency/impairments found/anticipated discharge recommendation/rehab potential/functional limitations in following categories/predicted duration of therapy intervention

## 2017-04-27 NOTE — PROGRESS NOTE ADULT - SUBJECTIVE AND OBJECTIVE BOX
[facility], THE HEART CENTER                                   [address] [place] [address]                                                      PHONE: [phone]                                                         FAX: [phone]  [email or web address]  ---------------------------------------------------------------------------------------------------------------------------------    Reason for Consult: Syncope    HPI:  NIC SUMNER is an 85y Female with Hx of chronic Afib, s/p Lariat at [facility] due to fall risk who had experienced an episode of LOC while at the bathroom overnight, Patient had been evaluated for SSS by Dr Leon in the past. Had been markedly weak recently not eating well as per family History , denies any CP or SOB.    S/P ILR yesterday    PAST MEDICAL & SURGICAL HISTORY:  Neuropathy  DM2 (diabetes mellitus, type 2)  HTN (hypertension)  CVA (cerebral vascular accident): left sided weakness  Atrial fibrillation: s/p lariat  No significant past surgical history      No Known Allergies      MEDICATIONS  (STANDING):  heparin  Injectable 5000Unit(s) SubCutaneous every 12 hours  insulin lispro (HumaLOG) corrective regimen sliding scale  SubCutaneous three times a day before meals  dextrose 5%. 1000milliLiter(s) IV Continuous <Continuous>  dextrose 50% Injectable 12.5Gram(s) IV Push once  docusate sodium 100milliGRAM(s) Oral three times a day  aspirin enteric coated 81milliGRAM(s) Oral daily  losartan 12.5milliGRAM(s) Oral daily  digoxin     Tablet 0.125milliGRAM(s) Oral daily  gabapentin 300milliGRAM(s) Oral three times a day  valACYclovir 1000milliGRAM(s) Oral every 8 hours  metoprolol succinate ER 12.5milliGRAM(s) Oral daily    MEDICATIONS  (PRN):  dextrose Gel 1Dose(s) Oral once PRN Blood Glucose LESS THAN 70 milliGRAM(s)/deciliter  glucagon  Injectable 1milliGRAM(s) IntraMuscular once PRN Glucose LESS THAN 70 milligrams/deciliter  ondansetron Injectable 4milliGRAM(s) IV Push every 6 hours PRN Nausea      Social History:  Cigarettes:                    Alchohol:                 Illicit Drug Abuse:      REVIEW OF SYSTEMS:    Constitutional: No fever, weight loss or fatigue  Eyes: No eye pain, visual disturbances, or discharge  ENMT:  No difficulty hearing, tinnitus, vertigo; No sinus or throat pain  Neck: No pain or stiffness  Respiratory: No cough, wheezing, chills or hemoptysis  Cardiovascular: No chest pain, palpitations, shortness of breath, dizziness or leg swelling  Gastrointestinal: No abdominal or epigastric pain. No nausea, vomiting or hematemesis; No diarrhea or constipation. No melena or hematochezia.  Genitourinary: No dysuria, frequency, hematuria or incontinence  Rectal: No pain, hemorrhoids or incontinence  Neurological: LOC  Skin: No itching, burning, rashes or lesions   Lymph Nodes: No enlarged glands  Endocrine: No heat or cold intolerance; No hair loss  Musculoskeletal: No joint pain or swelling; No muscle, back or extremity pain  Psychiatric: No depression, anxiety, mood swings or difficulty sleeping  Heme/Lymph: No easy bruising or bleeding gums  Allergy and Immunologic: No hives or eczema    Vital Signs Last 24 Hrs  T(C): 36.7, Max: 36.8 ( @ 03:07)  T(F): 98.1, Max: 98.2 ( @ 03:07)  HR: 79 (73 - 80)  BP: 141/75 (126/65 - 141/75)  BP(mean): 85 (85 - 85)  RR: 18 (18 - 20)  SpO2: 100% (98% - 100%)  ICU Vital Signs Last 24 Hrs  NIC SUMNER  I&O's Detail    I&O's Summary    Drug Dosing Weight  NIC RAMSAYGENETWILBERT      PHYSICAL EXAM:  General: Appears well developed, well nourished alert and cooperative.  HEENT: Head; normocephalic, atraumatic.  Eyes: Pupils reactive, cornea wnl.  Neck: Supple, no nodes adenopathy, no NVD or carotid bruit or thyromegaly.  CARDIOVASCULAR: Normal S1 and S2, No murmur, rub, gallop or lift.   LUNGS: No rales, rhonchi or wheeze. Normal breath sounds bilaterally.  ABDOMEN: Soft, nontender without mass or organomegaly. bowel sounds normoactive.  EXTREMITIES: No clubbing, cyanosis or edema. Distal pulses wnl.   SKIN: warm and dry with normal turgor.  NEURO: Alert/oriented x 3/normal motor exam. No pathologic reflexes.    PSYCH: normal affect.        LABS:                        14.6   13.2  )-----------( 208      ( 2017 21:03 )             42.2         139  |  98  |  25.0<H>  ----------------------------<  170<H>  4.8   |  27.0  |  1.49<H>    Ca    10.8<H>      2017 21:03    TPro  7.1  /  Alb  3.7  /  TBili  0.8  /  DBili  x   /  AST  67<H>  /  ALT  59<H>  /  AlkPhos  76      NIC SUMNER  CARDIAC MARKERS ( 2017 21:03 )  x     / <0.01 ng/mL / 62 U/L / x     / x          PT/INR - ( 2017 21:03 )   PT: 11.1 sec;   INR: 1.01 ratio         PTT - ( 2017 21:03 )  PTT:29.9 sec  Urinalysis Basic - ( 2017 21:11 )    Color: Yellow / Appearance: Cloudy / S.010 / pH: x  Gluc: x / Ketone: Negative  / Bili: Negative / Urobili: Negative mg/dL   Blood: x / Protein: 500 mg/dL / Nitrite: Negative   Leuk Esterase: Small / RBC: 0-2 /HPF / WBC 6-10   Sq Epi: x / Non Sq Epi: Occasional / Bacteria: Moderate        RADIOLOGY & ADDITIONAL STUDIES:    INTERPRETATION OF TELEMETRY (personally reviewed):    ECG: AFIB CVR RBBB      ACTIVE PROBLEMS:  HEALTH ISSUES - PROBLEM Dx:  HTN (hypertension): HTN (hypertension)  Atrial fibrillation: Atrial fibrillation  DM2 (diabetes mellitus, type 2): DM2 (diabetes mellitus, type 2)  Near syncope: Near syncope

## 2017-04-27 NOTE — PROGRESS NOTE ADULT - ASSESSMENT
Patient is 86 yo female, PMH a fib, CVA,  admitted with weakness, dizziness, near syncope, brief episode LOC. . - syncope-  post loop recorder for SSS per cardio, await PT evaluation and famioly and patient want to got o rehab cause hse has had slow declining muscle strength     ARF with possible CRf -stop ARB and follow s creatine

## 2017-04-27 NOTE — CONSULT NOTE ADULT - ASSESSMENT
The patient is a 85y Female with syncopal event post-BM, likely vasovagal.  There is nothing from this presentation that is concerning for seizure.  She has a new ILR placed yesterday  no further neuro workup suggested  she may be discharged once medically stable    Eros Johnston MD, PhD   [id number]

## 2017-04-27 NOTE — PROGRESS NOTE ADULT - PROBLEM SELECTOR PLAN 1
CT head negative. Carotid doppler results appreciated, no stenosis. Cardiology consult appreciated. Plan is telemetry monitoring, follow serial Troponin and post ILR  for SSS by EP. Continue present cardiac meds and obtain PT evaluation. Neuro consult called, Dr. Johnston, they see her as outpatient. Cont neuro checks q8h.

## 2017-04-27 NOTE — CONSULT NOTE ADULT - ASSESSMENT
85 WF s/p likely syncope, now s/p Loop recorder has long standing htn and DM for 3 yrs which could cause some CKD  Also on LOsartan- off now  Also on Valtrex which can cause ROBYN and dose needs to be adjusted 1 g/day or At most upto 2 g/day for severe sx  shallcheck MERLIN but will wait before checking ANCA C3 C4 etc  check Sono, U c/s and 24 urines when creat stable  Thanks

## 2017-04-27 NOTE — PHYSICAL THERAPY INITIAL EVALUATION ADULT - GAIT PATTERN USED, PT EVAL
unsteadiness throughout, decreased gait velocity, decreased alondra step length, decreased upright posture

## 2017-04-27 NOTE — PROGRESS NOTE ADULT - ASSESSMENT
Assessment and Plan:    HPI:  NIC SUMNER is an 85y Female with Hx of chronic Afib, s/p Lariat at [facility] due to fall risk who had experienced an episode of LOC while at the bathroom overnight, Patient had been evaluated for SSS by Dr Leon in the past. Had been markedly weak recently not eating well as per family History , denies any CP or SOB        s/p ILR placement for SSS evaluation by EP  Continue present cardiac therapy, PT evaluation  Discussed with family at bedside  will FUP at office    EL HEBERT MD, FACC, [place]

## 2017-04-27 NOTE — PHYSICAL THERAPY INITIAL EVALUATION ADULT - MANUAL MUSCLE TESTING RESULTS, REHAB EVAL
except right UE: grossly 4-/5 throughout, right LE: hip 3+/5, knee 3+/5, ankle 4-/5, left LE: 4-/5 throughout/no strength deficits were identified

## 2017-04-28 LAB
ALBUMIN SERPL ELPH-MCNC: 3.3 G/DL — SIGNIFICANT CHANGE UP (ref 3.3–5.2)
ALP SERPL-CCNC: 71 U/L — SIGNIFICANT CHANGE UP (ref 40–120)
ALT FLD-CCNC: 32 U/L — SIGNIFICANT CHANGE UP
ANION GAP SERPL CALC-SCNC: 13 MMOL/L — SIGNIFICANT CHANGE UP (ref 5–17)
AST SERPL-CCNC: 65 U/L — HIGH
BILIRUB SERPL-MCNC: 0.7 MG/DL — SIGNIFICANT CHANGE UP (ref 0.4–2)
BUN SERPL-MCNC: 29 MG/DL — HIGH (ref 8–20)
CALCIUM SERPL-MCNC: 10.4 MG/DL — HIGH (ref 8.6–10.2)
CHLORIDE SERPL-SCNC: 98 MMOL/L — SIGNIFICANT CHANGE UP (ref 98–107)
CO2 SERPL-SCNC: 26 MMOL/L — SIGNIFICANT CHANGE UP (ref 22–29)
CREAT SERPL-MCNC: 1.69 MG/DL — HIGH (ref 0.5–1.3)
GLUCOSE SERPL-MCNC: 139 MG/DL — HIGH (ref 70–115)
HCT VFR BLD CALC: 42.2 % — SIGNIFICANT CHANGE UP (ref 37–47)
HGB BLD-MCNC: 14.6 G/DL — SIGNIFICANT CHANGE UP (ref 12–16)
MCHC RBC-ENTMCNC: 32.3 PG — HIGH (ref 27–31)
MCHC RBC-ENTMCNC: 34.6 G/DL — SIGNIFICANT CHANGE UP (ref 32–36)
MCV RBC AUTO: 93.4 FL — SIGNIFICANT CHANGE UP (ref 81–99)
PLATELET # BLD AUTO: 184 K/UL — SIGNIFICANT CHANGE UP (ref 150–400)
POTASSIUM SERPL-MCNC: 4 MMOL/L — SIGNIFICANT CHANGE UP (ref 3.5–5.3)
POTASSIUM SERPL-SCNC: 4 MMOL/L — SIGNIFICANT CHANGE UP (ref 3.5–5.3)
PROT SERPL-MCNC: 6.7 G/DL — SIGNIFICANT CHANGE UP (ref 6.6–8.7)
RBC # BLD: 4.52 M/UL — SIGNIFICANT CHANGE UP (ref 4.4–5.2)
RBC # FLD: 14.2 % — SIGNIFICANT CHANGE UP (ref 11–15.6)
SODIUM SERPL-SCNC: 137 MMOL/L — SIGNIFICANT CHANGE UP (ref 135–145)
WBC # BLD: 7.3 K/UL — SIGNIFICANT CHANGE UP (ref 4.8–10.8)
WBC # FLD AUTO: 7.3 K/UL — SIGNIFICANT CHANGE UP (ref 4.8–10.8)

## 2017-04-28 PROCEDURE — 99233 SBSQ HOSP IP/OBS HIGH 50: CPT

## 2017-04-28 PROCEDURE — 76700 US EXAM ABDOM COMPLETE: CPT | Mod: 26

## 2017-04-28 RX ADMIN — HEPARIN SODIUM 5000 UNIT(S): 5000 INJECTION INTRAVENOUS; SUBCUTANEOUS at 21:38

## 2017-04-28 RX ADMIN — Medication 100 MILLIGRAM(S): at 05:33

## 2017-04-28 RX ADMIN — Medication 0.12 MILLIGRAM(S): at 05:33

## 2017-04-28 RX ADMIN — Medication 250 MILLIGRAM(S): at 17:28

## 2017-04-28 RX ADMIN — GABAPENTIN 300 MILLIGRAM(S): 400 CAPSULE ORAL at 15:17

## 2017-04-28 RX ADMIN — HEPARIN SODIUM 5000 UNIT(S): 5000 INJECTION INTRAVENOUS; SUBCUTANEOUS at 12:23

## 2017-04-28 RX ADMIN — Medication 250 MILLIGRAM(S): at 05:33

## 2017-04-28 RX ADMIN — Medication 100 MILLIGRAM(S): at 15:17

## 2017-04-28 RX ADMIN — Medication 12.5 MILLIGRAM(S): at 05:33

## 2017-04-28 RX ADMIN — GABAPENTIN 300 MILLIGRAM(S): 400 CAPSULE ORAL at 05:33

## 2017-04-28 RX ADMIN — Medication 100 MILLIGRAM(S): at 21:38

## 2017-04-28 RX ADMIN — GABAPENTIN 300 MILLIGRAM(S): 400 CAPSULE ORAL at 21:38

## 2017-04-28 RX ADMIN — Medication 81 MILLIGRAM(S): at 15:17

## 2017-04-28 NOTE — PROGRESS NOTE ADULT - PROBLEM SELECTOR PLAN 4
Continue with Metoprolol 12.5 mg qd, and Digoxin .125 mg qd.  S/P Lariat procedure.

## 2017-04-28 NOTE — PROGRESS NOTE ADULT - ASSESSMENT
ROBYN on CKD suspect element of diabetic nephropathy HgA1c 6.8  Acyclovir may have contributed to ROBYN  No hydro on renal sono  Serologies pending

## 2017-04-28 NOTE — PROGRESS NOTE ADULT - ASSESSMENT
Patient is 84 yo female, PMH a fib, CVA,  admitted with weakness, dizziness, near syncope, brief episode LOC. . - syncope-  post loop recorder for SSS per cardio, await PT evaluation and family and patient want to got o rehab cause hse has had slow declining muscle strength     ARF with possible CRF -stop ARB and follow s creatine, stopped acyclovir  - will monitor another day for improvement in renal function monitoring

## 2017-04-28 NOTE — PROGRESS NOTE ADULT - PROBLEM SELECTOR PLAN 2
Toprol 12.5 mg qd monitor BP, off ARB with worsening renal failure
Toprol 12.5 mg qd monitor BP, off ARB with worsening renal failure
Losartan 12.5 mg qd, Toprol 12.5 mg qd monitor BP

## 2017-04-28 NOTE — PROGRESS NOTE ADULT - PROBLEM SELECTOR PLAN 6
Worsening renal function from yesterday. Creatinine 1.84/ BUN 26. Will administer IVF. Nephro consult ordered, [facility]. Avoid nephrotoxins.

## 2017-04-28 NOTE — PROGRESS NOTE ADULT - SUBJECTIVE AND OBJECTIVE BOX
NIC SUMNER Patient is a 85y old  Female who presents with a chief complaint of DiZziness (25 Apr 2017 23:48)     HPI:  Patient is 86 yo female with hx of Atrial fib S/P lariat procedure, and CVA presenting with dizziness, and near syncopal episodes.  Patient reports that she went to the bathroom to have BM when she started to feel dizzy, and experienced generalized weakness which resolved after several Minutes.     Offers no other complaints (25 Apr 2017 23:48)    The patient was seen and evaluated renal failure, unclear baseline, family doesn't know why patient is on acyclovir  The patient is in no acute distress.  Denied any fever chest pain, palpitations, shortness of breath, abdominal pain, fever, dysuria, cough, edema       I&O's Summary    I & Os for current day (as of 28 Apr 2017 12:24)  =============================================  IN: 600 ml / OUT: 400 ml / NET: 200 ml    Allergies    No Known Allergies    Intolerances      HEALTH ISSUES - PROBLEM Dx:  Renal insufficiency: Renal insufficiency  Weakness: Weakness  Syncope: Syncope  HTN (hypertension): HTN (hypertension)  Atrial fibrillation: Atrial fibrillation  DM2 (diabetes mellitus, type 2): DM2 (diabetes mellitus, type 2)  Near syncope: Near syncope        PAST MEDICAL & SURGICAL HISTORY:  Neuropathy  DM2 (diabetes mellitus, type 2)  HTN (hypertension)  CVA (cerebral vascular accident): left sided weakness  Atrial fibrillation: s/p lariat  No significant past surgical history          Vital Signs Last 24 Hrs  T(C): 36.7, Max: 37 (04-27 @ 21:19)  T(F): 98, Max: 98.6 (04-27 @ 21:19)  HR: 73 (73 - 84)  BP: 104/62 (98/56 - 112/70)  BP(mean): --  RR: 16 (16 - 16)  SpO2: 96% (96% - 97%)T(C): 36.7, Max: 37 (04-27 @ 21:19)  HR: 73 (73 - 84)  BP: 104/62 (98/56 - 112/70)  RR: 16 (16 - 16)  SpO2: 96% (96% - 97%)  Wt(kg): --    PHYSICAL EXAM:    GENERAL: NAD, elderly female, pleasant   HEAD:  Atraumatic, Normocephalic  EYES: EOMI, PERRLA, conjunctiva and sclera clear  ENMT:  Moist mucous membranes,  No lesions  NECK: Supple, No JVD, Normal thyroid  NERVOUS SYSTEM:  Alert & Oriented X3,  Moves upper and lower extremities, weakness of the left side arm from prior CVa and walks with walker ; CNS-II-XII  CHEST/LUNG: Clear to auscultation bilaterally; No rales, rhonchi, wheezing,   HEART: Regular rate and rhythm; No murmurs,   ABDOMEN: Soft, Nontender, Nondistended; Bowel sounds present  EXTREMITIES:  Peripheral Pulses, No  cyanosis, or edema  SKIN: No rashes or lesions  psychiatry- mood and affect appropriate     heparin  Injectable 5000Unit(s) SubCutaneous every 12 hours  insulin lispro (HumaLOG) corrective regimen sliding scale  SubCutaneous three times a day before meals  dextrose 5%. 1000milliLiter(s) IV Continuous <Continuous>  dextrose Gel 1Dose(s) Oral once PRN  dextrose 50% Injectable 12.5Gram(s) IV Push once  glucagon  Injectable 1milliGRAM(s) IntraMuscular once PRN  ondansetron Injectable 4milliGRAM(s) IV Push every 6 hours PRN  docusate sodium 100milliGRAM(s) Oral three times a day  aspirin enteric coated 81milliGRAM(s) Oral daily  digoxin     Tablet 0.125milliGRAM(s) Oral daily  gabapentin 300milliGRAM(s) Oral three times a day  metoprolol succinate ER 12.5milliGRAM(s) Oral daily  cephalexin 250milliGRAM(s) Oral every 12 hours      LABS:                          14.6   7.3   )-----------( 184      ( 28 Apr 2017 06:59 )             42.2     04-28    137  |  98  |  29.0<H>  ----------------------------<  139<H>  4.0   |  26.0  |  1.69<H>    Ca    10.4<H>      28 Apr 2017 06:59    TPro  6.7  /  Alb  3.3  /  TBili  0.7  /  DBili  x   /  AST  65<H>  /  ALT  32  /  AlkPhos  71  04-28    LIVER FUNCTIONS - ( 28 Apr 2017 06:59 )  Alb: 3.3 g/dL / Pro: 6.7 g/dL / ALK PHOS: 71 U/L / ALT: 32 U/L / AST: 65 U/L / GGT: x             CARDIAC MARKERS ( 27 Apr 2017 07:51 )  x     / <0.01 ng/mL / x     / x     / x            CAPILLARY BLOOD GLUCOSE  149 (28 Apr 2017 12:15)  152 (27 Apr 2017 21:19)  129 (27 Apr 2017 17:24)      RADIOLOGY & ADDITIONAL TESTS:      Consultant notes reviewed    Case discussed with consultant/provider/ family /patient

## 2017-04-28 NOTE — PROGRESS NOTE ADULT - PROBLEM SELECTOR PLAN 1
CT head negative. Carotid doppler results appreciated, no stenosis. Cardiology consult post ILR   by EP. Continue present cardiac meds and obtain PT recommeded [facility] Neuro consult called, Dr. Johnston, they see her as outpatient

## 2017-04-28 NOTE — PROGRESS NOTE ADULT - SUBJECTIVE AND OBJECTIVE BOX
NEPHROLOGY INTERVAL HPI/OVERNIGHT EVENTS:    Examined earlier  Looks comfortable    MEDICATIONS  (STANDING):  heparin  Injectable 5000Unit(s) SubCutaneous every 12 hours  insulin lispro (HumaLOG) corrective regimen sliding scale  SubCutaneous three times a day before meals  dextrose 5%. 1000milliLiter(s) IV Continuous <Continuous>  dextrose 50% Injectable 12.5Gram(s) IV Push once  docusate sodium 100milliGRAM(s) Oral three times a day  aspirin enteric coated 81milliGRAM(s) Oral daily  digoxin     Tablet 0.125milliGRAM(s) Oral daily  gabapentin 300milliGRAM(s) Oral three times a day  metoprolol succinate ER 12.5milliGRAM(s) Oral daily  cephalexin 250milliGRAM(s) Oral every 12 hours    MEDICATIONS  (PRN):  dextrose Gel 1Dose(s) Oral once PRN Blood Glucose LESS THAN 70 milliGRAM(s)/deciliter  glucagon  Injectable 1milliGRAM(s) IntraMuscular once PRN Glucose LESS THAN 70 milligrams/deciliter  ondansetron Injectable 4milliGRAM(s) IV Push every 6 hours PRN Nausea      Allergies    No Known Allergies    Intolerances        Vital Signs Last 24 Hrs  T(C): 36.7, Max: 37 ( @ 21:19)  T(F): 98, Max: 98.6 ( @ 21:19)  HR: 73 (73 - 84)  BP: 106/64 (98/56 - 112/70)  BP(mean): --  RR: 16 (16 - 16)  SpO2: 97% (96% - 97%)  Daily     Daily Weight in k.6 (2017 05:00)    PHYSICAL EXAM:  GENERAL: NAD,   EYES:  conjunctiva and sclera clear  NECK: Supple, No JVD/Bruit, Normal thyroid  NERVOUS SYSTEM:  A/O x3,   CHEST/LUNG:CTA No rales, rhonchi,  or rubs  HEART: Regular rate and rhythm; No murmurs, rubs, or gallops  ABDOMEN: Soft, NT/ND BS+  EXTREMITIES:  + Peripheral Pulses, No C/C/E  SKIN: No rashes or lesions    LABS:                        14.6   7.3   )-----------( 184      ( 2017 06:59 )             42.2     -    137  |  98  |  29.0<H>  ----------------------------<  139<H>  4.0   |  26.0  |  1.69<H>    Ca    10.4<H>      2017 06:59    TPro  6.7  /  Alb  3.3  /  TBili  0.7  /  DBili  x   /  AST  65<H>  /  ALT  32  /  AlkPhos  71                  RADIOLOGY & ADDITIONAL TESTS:

## 2017-04-29 VITALS
HEART RATE: 62 BPM | DIASTOLIC BLOOD PRESSURE: 64 MMHG | OXYGEN SATURATION: 96 % | SYSTOLIC BLOOD PRESSURE: 108 MMHG | TEMPERATURE: 98 F | RESPIRATION RATE: 16 BRPM

## 2017-04-29 LAB
ANA PAT FLD IF-IMP: ABNORMAL
ANA TITR SER: ABNORMAL
ANION GAP SERPL CALC-SCNC: 13 MMOL/L — SIGNIFICANT CHANGE UP (ref 5–17)
BUN SERPL-MCNC: 28 MG/DL — HIGH (ref 8–20)
CALCIUM SERPL-MCNC: 10.5 MG/DL — HIGH (ref 8.6–10.2)
CHLORIDE SERPL-SCNC: 97 MMOL/L — LOW (ref 98–107)
CO2 SERPL-SCNC: 29 MMOL/L — SIGNIFICANT CHANGE UP (ref 22–29)
CREAT SERPL-MCNC: 1.26 MG/DL — SIGNIFICANT CHANGE UP (ref 0.5–1.3)
GLUCOSE SERPL-MCNC: 225 MG/DL — HIGH (ref 70–115)
POTASSIUM SERPL-MCNC: 4.5 MMOL/L — SIGNIFICANT CHANGE UP (ref 3.5–5.3)
POTASSIUM SERPL-SCNC: 4.5 MMOL/L — SIGNIFICANT CHANGE UP (ref 3.5–5.3)
SODIUM SERPL-SCNC: 139 MMOL/L — SIGNIFICANT CHANGE UP (ref 135–145)

## 2017-04-29 PROCEDURE — 99285 EMERGENCY DEPT VISIT HI MDM: CPT | Mod: 25

## 2017-04-29 PROCEDURE — 36415 COLL VENOUS BLD VENIPUNCTURE: CPT

## 2017-04-29 PROCEDURE — 84484 ASSAY OF TROPONIN QUANT: CPT

## 2017-04-29 PROCEDURE — 80048 BASIC METABOLIC PNL TOTAL CA: CPT

## 2017-04-29 PROCEDURE — 85610 PROTHROMBIN TIME: CPT

## 2017-04-29 PROCEDURE — 81001 URINALYSIS AUTO W/SCOPE: CPT

## 2017-04-29 PROCEDURE — 93880 EXTRACRANIAL BILAT STUDY: CPT

## 2017-04-29 PROCEDURE — 99239 HOSP IP/OBS DSCHRG MGMT >30: CPT

## 2017-04-29 PROCEDURE — 71045 X-RAY EXAM CHEST 1 VIEW: CPT

## 2017-04-29 PROCEDURE — 93005 ELECTROCARDIOGRAM TRACING: CPT

## 2017-04-29 PROCEDURE — C1764: CPT

## 2017-04-29 PROCEDURE — 93306 TTE W/DOPPLER COMPLETE: CPT

## 2017-04-29 PROCEDURE — 97163 PT EVAL HIGH COMPLEX 45 MIN: CPT

## 2017-04-29 PROCEDURE — 33282: CPT

## 2017-04-29 PROCEDURE — 85027 COMPLETE CBC AUTOMATED: CPT

## 2017-04-29 PROCEDURE — 86038 ANTINUCLEAR ANTIBODIES: CPT

## 2017-04-29 PROCEDURE — 85730 THROMBOPLASTIN TIME PARTIAL: CPT

## 2017-04-29 PROCEDURE — 70450 CT HEAD/BRAIN W/O DYE: CPT

## 2017-04-29 PROCEDURE — 76700 US EXAM ABDOM COMPLETE: CPT

## 2017-04-29 PROCEDURE — 83036 HEMOGLOBIN GLYCOSYLATED A1C: CPT

## 2017-04-29 PROCEDURE — 80053 COMPREHEN METABOLIC PANEL: CPT

## 2017-04-29 PROCEDURE — 82550 ASSAY OF CK (CPK): CPT

## 2017-04-29 RX ORDER — VALACYCLOVIR 500 MG/1
1 TABLET, FILM COATED ORAL
Qty: 0 | Refills: 0 | COMMUNITY

## 2017-04-29 RX ORDER — DOCUSATE SODIUM 100 MG
1 CAPSULE ORAL
Qty: 0 | Refills: 0 | DISCHARGE
Start: 2017-04-29

## 2017-04-29 RX ORDER — LOSARTAN POTASSIUM 100 MG/1
0.5 TABLET, FILM COATED ORAL
Qty: 0 | Refills: 0 | COMMUNITY

## 2017-04-29 RX ADMIN — Medication 0.12 MILLIGRAM(S): at 05:44

## 2017-04-29 RX ADMIN — Medication 100 MILLIGRAM(S): at 05:44

## 2017-04-29 RX ADMIN — Medication 81 MILLIGRAM(S): at 12:30

## 2017-04-29 RX ADMIN — GABAPENTIN 300 MILLIGRAM(S): 400 CAPSULE ORAL at 05:44

## 2017-04-29 RX ADMIN — Medication 250 MILLIGRAM(S): at 05:44

## 2017-04-29 RX ADMIN — HEPARIN SODIUM 5000 UNIT(S): 5000 INJECTION INTRAVENOUS; SUBCUTANEOUS at 10:35

## 2017-04-29 NOTE — DISCHARGE NOTE ADULT - MEDICATION SUMMARY - MEDICATIONS TO STOP TAKING
I will STOP taking the medications listed below when I get home from the hospital:    losartan 25 mg oral tablet  -- 0.5 tab(s) by mouth once a day    Valtrex 1 g oral tablet  -- 1 tab(s) by mouth every 8 hours

## 2017-04-29 NOTE — DISCHARGE NOTE ADULT - SECONDARY DIAGNOSIS.
Renal insufficiency Urinary tract infection without hematuria, site unspecified Type 2 diabetes mellitus without complication, without long-term current use of insulin Essential hypertension Chronic atrial fibrillation

## 2017-04-29 NOTE — DISCHARGE NOTE ADULT - CARE PROVIDER_API CALL
Mookie Hayes), Cardiovascular Disease; Internal Medicine  [address] [place] [address]  Phone: [phone]  Fax: [phone]

## 2017-04-29 NOTE — DISCHARGE NOTE ADULT - CARE PLAN
Principal Discharge DX:	Syncope, unspecified syncope type  Goal:	resolved  Instructions for follow-up, activity and diet:	now post PPM, follow up with cardio out pt  Secondary Diagnosis:	Renal insufficiency  Secondary Diagnosis:	Urinary tract infection without hematuria, site unspecified  Secondary Diagnosis:	Type 2 diabetes mellitus without complication, without long-term current use of insulin  Secondary Diagnosis:	Essential hypertension  Secondary Diagnosis:	Chronic atrial fibrillation

## 2017-04-29 NOTE — DISCHARGE NOTE ADULT - HOSPITAL COURSE
Patient is 86 yo female with hx of Atrial fib S/P lariat procedure, and CVA presenting with dizziness, and near syncopal episodes.  Patient reports that she went to the bathroom to have BM when she started to feel dizzy, and experienced generalized weakness which resolved after several Minutes.   patient admitted for syncope had a loop recorder placed by [facility] for SSS, also had possible UTI with bacturia and was treated with short course of antibiotics, urine culture not collected ,patient asymptomatic   patient also with ARF resolved after holding ARB and valtrex and metformin, since all three can worsen renal failure, started Metformin again, recommend follow up with PMD to repeat s creatinine at PMD office     GENERAL: NAD, well-groomed, well-developed, elderly female   HEAD:  Atraumatic, Normocephalic  EYES: EOMI, PERRLA, conjunctiva and sclera clear  ENMT: No tonsillar erythema, exudates, or enlargement; Moist mucous membranes  NECK: Supple, No JVD,   NERVOUS SYSTEM:  Alert & Oriented X3, Good concentration; Moves B/L upper and lower extremities, some weakness left arm and legs   CHEST/LUNG: Clear to percussion bilaterally; No rales, rhonchi, wheezing, or rubs  HEART: Regular rate and rhythm; No murmurs, rubs, or gallops  ABDOMEN: Soft, Nontender, Nondistended; Bowel sounds present  EXTREMITIES:  2+ Peripheral Pulses, No clubbing, cyanosis, or edema  SKIN: No rashes or lesions  over 35 minutes spend in discharge

## 2017-04-29 NOTE — DISCHARGE NOTE ADULT - MEDICATION SUMMARY - MEDICATIONS TO TAKE
I will START or STAY ON the medications listed below when I get home from the hospital:    aspirin 81 mg oral tablet  -- 1 tab(s) by mouth once a day  -- Indication: For Atrial fibrillation    digoxin 125 mcg (0.125 mg) oral tablet  -- 1 tab(s) by mouth once a day  -- Indication: For Atrial fibrillation    gabapentin 300 mg oral capsule  -- 1 cap(s) by mouth 3 times a day  -- Indication: For Neuropathy    metFORMIN 1000 mg oral tablet  -- 1 tab(s) by mouth 2 times a day  -- Indication: For DM2 (diabetes mellitus, type 2)    metoprolol succinate 25 mg oral tablet, extended release  -- 0.5 tab(s) by mouth once a day  -- Indication: For HTN (hypertension)    docusate sodium 100 mg oral capsule  -- 1 cap(s) by mouth 3 times a day  -- Indication: For Constipation

## 2017-12-15 NOTE — PHYSICAL THERAPY INITIAL EVALUATION ADULT - ADDITIONAL COMMENTS
Needs appt
Patient active on [email or web address], sent via [email or web address].
Please see request from 11/30/17.       Requested Prescriptions     Refused Prescriptions Disp Refills    hydroCHLOROthiazide (HYDRODIURIL) 12.5 mg tablet [Pharmacy Med Name: HYDROCHLOROTHIAZIDE 12.5 MG TB] 30 Tab 0     Sig: take 1 tablet by mouth every morning     Refused By: Kamryn Earl     Reason for Refusal: Appt required, please call patient
as per pt: she ambulates short distances with little to no help, owns and uses a RW, has a W/C for outdoor use, no stairs to negotiate at home

## 2019-02-19 NOTE — PROGRESS NOTE ADULT - PROBLEM/PLAN-4
DISPLAY PLAN FREE TEXT Patient with one or more new problems requiring additional work-up/treatment.

## 2019-05-14 NOTE — PROGRESS NOTE ADULT - PROBLEM SELECTOR PLAN 5
Clinic hours for Dr. Castro:    Monday 7:15am - 3pm  Tuesday 7:15am - 3pm   Wednesday 7:15am - 3pm  Thursday Not in  Friday  7:15am - 3pm    If you need a refill on your prescription please call your pharmacy and let them know. Please be proactive and call before your medication runs out.    The pharmacy will then contact us for the refill.  Please allow 24-48 hours for the refill to be processed.     If your physician has ordered additional laboratory or radiology testing as part of your ongoing plan of care, please allow 5-7 business days from the day of your lab draw or test for the results to be sent and reviewed by your provider. If your results are critical and require more immediate intervention, you will be contacted sooner. Your results will be conveyed to you via a phone call or letter.    You may be receiving a patient satisfaction survey in the mail.   Please take the time to complete, as your feedback is very important to us.   We strive to make your experience exceptional and your comments help us with that goal.  We look forward to hearing from you.     
Family reports decline in ADLs/weakness. PT consult. Social Work consult .
Family reports decline in ADLs/weakness. Will order PT consult. Social Work consult .SW saw pt w family at bedside. Will await recommendations of PT and SW.
Family reports decline in ADLs/weakness. Will order PT consult. Social Work consult .SW saw pt w family at bedside. Will await recommendations of PT and SW.

## 2019-12-07 ENCOUNTER — EMERGENCY (EMERGENCY)
Facility: HOSPITAL | Age: 84
LOS: 1 days | Discharge: HOME | End: 2019-12-07
Attending: EMERGENCY MEDICINE
Payer: MEDICARE

## 2019-12-07 VITALS — HEIGHT: 61 IN | WEIGHT: 130.07 LBS

## 2019-12-07 PROBLEM — I48.91 UNSPECIFIED ATRIAL FIBRILLATION: Chronic | Status: ACTIVE | Noted: 2017-04-25

## 2019-12-07 PROBLEM — E11.9 TYPE 2 DIABETES MELLITUS WITHOUT COMPLICATIONS: Chronic | Status: ACTIVE | Noted: 2017-04-25

## 2019-12-07 PROBLEM — I63.9 CEREBRAL INFARCTION, UNSPECIFIED: Chronic | Status: ACTIVE | Noted: 2017-04-25

## 2019-12-07 PROBLEM — G62.9 POLYNEUROPATHY, UNSPECIFIED: Chronic | Status: ACTIVE | Noted: 2017-04-25

## 2019-12-07 PROBLEM — I10 ESSENTIAL (PRIMARY) HYPERTENSION: Chronic | Status: ACTIVE | Noted: 2017-04-25

## 2019-12-07 LAB
ALBUMIN SERPL ELPH-MCNC: 3.6 G/DL — SIGNIFICANT CHANGE UP (ref 3.3–5.2)
ALP SERPL-CCNC: 80 U/L — SIGNIFICANT CHANGE UP (ref 40–120)
ALT FLD-CCNC: 19 U/L — SIGNIFICANT CHANGE UP
ANION GAP SERPL CALC-SCNC: 15 MMOL/L — SIGNIFICANT CHANGE UP (ref 5–17)
APPEARANCE UR: CLEAR — SIGNIFICANT CHANGE UP
APTT BLD: 28.2 SEC — SIGNIFICANT CHANGE UP (ref 27.5–36.3)
AST SERPL-CCNC: 28 U/L — SIGNIFICANT CHANGE UP
BACTERIA # UR AUTO: ABNORMAL
BASOPHILS # BLD AUTO: 0.04 K/UL — SIGNIFICANT CHANGE UP (ref 0–0.2)
BASOPHILS NFR BLD AUTO: 0.3 % — SIGNIFICANT CHANGE UP (ref 0–2)
BILIRUB SERPL-MCNC: 0.4 MG/DL — SIGNIFICANT CHANGE UP (ref 0.4–2)
BILIRUB UR-MCNC: NEGATIVE — SIGNIFICANT CHANGE UP
BUN SERPL-MCNC: 13 MG/DL — SIGNIFICANT CHANGE UP (ref 8–20)
CALCIUM SERPL-MCNC: 9.8 MG/DL — SIGNIFICANT CHANGE UP (ref 8.6–10.2)
CHLORIDE SERPL-SCNC: 99 MMOL/L — SIGNIFICANT CHANGE UP (ref 98–107)
CO2 SERPL-SCNC: 24 MMOL/L — SIGNIFICANT CHANGE UP (ref 22–29)
COLOR SPEC: YELLOW — SIGNIFICANT CHANGE UP
CREAT SERPL-MCNC: 0.65 MG/DL — SIGNIFICANT CHANGE UP (ref 0.5–1.3)
DIFF PNL FLD: ABNORMAL
EOSINOPHIL # BLD AUTO: 0.07 K/UL — SIGNIFICANT CHANGE UP (ref 0–0.5)
EOSINOPHIL NFR BLD AUTO: 0.6 % — SIGNIFICANT CHANGE UP (ref 0–6)
EPI CELLS # UR: ABNORMAL
GLUCOSE SERPL-MCNC: 108 MG/DL — SIGNIFICANT CHANGE UP (ref 70–115)
GLUCOSE UR QL: NEGATIVE — SIGNIFICANT CHANGE UP
HCT VFR BLD CALC: 45.2 % — HIGH (ref 34.5–45)
HGB BLD-MCNC: 14.5 G/DL — SIGNIFICANT CHANGE UP (ref 11.5–15.5)
IMM GRANULOCYTES NFR BLD AUTO: 0.3 % — SIGNIFICANT CHANGE UP (ref 0–1.5)
INR BLD: 1.03 RATIO — SIGNIFICANT CHANGE UP (ref 0.88–1.16)
KETONES UR-MCNC: ABNORMAL
LEUKOCYTE ESTERASE UR-ACNC: ABNORMAL
LYMPHOCYTES # BLD AUTO: 1.18 K/UL — SIGNIFICANT CHANGE UP (ref 1–3.3)
LYMPHOCYTES # BLD AUTO: 10.1 % — LOW (ref 13–44)
MAGNESIUM SERPL-MCNC: 2.1 MG/DL — SIGNIFICANT CHANGE UP (ref 1.6–2.6)
MCHC RBC-ENTMCNC: 30.3 PG — SIGNIFICANT CHANGE UP (ref 27–34)
MCHC RBC-ENTMCNC: 32.1 GM/DL — SIGNIFICANT CHANGE UP (ref 32–36)
MCV RBC AUTO: 94.6 FL — SIGNIFICANT CHANGE UP (ref 80–100)
MONOCYTES # BLD AUTO: 0.94 K/UL — HIGH (ref 0–0.9)
MONOCYTES NFR BLD AUTO: 8.1 % — SIGNIFICANT CHANGE UP (ref 2–14)
NEUTROPHILS # BLD AUTO: 9.38 K/UL — HIGH (ref 1.8–7.4)
NEUTROPHILS NFR BLD AUTO: 80.6 % — HIGH (ref 43–77)
NITRITE UR-MCNC: POSITIVE
NT-PROBNP SERPL-SCNC: 624 PG/ML — HIGH (ref 0–300)
PH UR: 6 — SIGNIFICANT CHANGE UP (ref 5–8)
PLATELET # BLD AUTO: 201 K/UL — SIGNIFICANT CHANGE UP (ref 150–400)
POTASSIUM SERPL-MCNC: 4.2 MMOL/L — SIGNIFICANT CHANGE UP (ref 3.5–5.3)
POTASSIUM SERPL-SCNC: 4.2 MMOL/L — SIGNIFICANT CHANGE UP (ref 3.5–5.3)
PROT SERPL-MCNC: 6.6 G/DL — SIGNIFICANT CHANGE UP (ref 6.6–8.7)
PROT UR-MCNC: 15
PROTHROM AB SERPL-ACNC: 11.9 SEC — SIGNIFICANT CHANGE UP (ref 10–12.9)
RBC # BLD: 4.78 M/UL — SIGNIFICANT CHANGE UP (ref 3.8–5.2)
RBC # FLD: 13.7 % — SIGNIFICANT CHANGE UP (ref 10.3–14.5)
RBC CASTS # UR COMP ASSIST: SIGNIFICANT CHANGE UP /HPF (ref 0–4)
SODIUM SERPL-SCNC: 138 MMOL/L — SIGNIFICANT CHANGE UP (ref 135–145)
SP GR SPEC: 1.02 — SIGNIFICANT CHANGE UP (ref 1.01–1.02)
TROPONIN T SERPL-MCNC: <0.01 NG/ML — SIGNIFICANT CHANGE UP (ref 0–0.06)
UROBILINOGEN FLD QL: NEGATIVE — SIGNIFICANT CHANGE UP
WBC # BLD: 11.65 K/UL — HIGH (ref 3.8–10.5)
WBC # FLD AUTO: 11.65 K/UL — HIGH (ref 3.8–10.5)
WBC UR QL: ABNORMAL

## 2019-12-07 PROCEDURE — 93010 ELECTROCARDIOGRAM REPORT: CPT | Mod: 77

## 2019-12-07 PROCEDURE — 70450 CT HEAD/BRAIN W/O DYE: CPT | Mod: 26

## 2019-12-07 PROCEDURE — 93010 ELECTROCARDIOGRAM REPORT: CPT | Mod: 76

## 2019-12-07 PROCEDURE — 71045 X-RAY EXAM CHEST 1 VIEW: CPT | Mod: 26

## 2019-12-07 PROCEDURE — 73030 X-RAY EXAM OF SHOULDER: CPT | Mod: 26,RT

## 2019-12-07 PROCEDURE — 93880 EXTRACRANIAL BILAT STUDY: CPT | Mod: 26

## 2019-12-07 PROCEDURE — 99218: CPT

## 2019-12-07 RX ORDER — METOPROLOL TARTRATE 50 MG
12.5 TABLET ORAL
Refills: 0 | Status: DISCONTINUED | OUTPATIENT
Start: 2019-12-07 | End: 2019-12-17

## 2019-12-07 RX ORDER — ESCITALOPRAM OXALATE 10 MG/1
10 TABLET, FILM COATED ORAL AT BEDTIME
Refills: 0 | Status: DISCONTINUED | OUTPATIENT
Start: 2019-12-07 | End: 2019-12-17

## 2019-12-07 RX ORDER — METFORMIN HYDROCHLORIDE 850 MG/1
500 TABLET ORAL
Refills: 0 | Status: DISCONTINUED | OUTPATIENT
Start: 2019-12-07 | End: 2019-12-17

## 2019-12-07 RX ORDER — METFORMIN HYDROCHLORIDE 850 MG/1
1 TABLET ORAL
Qty: 0 | Refills: 0 | DISCHARGE

## 2019-12-07 RX ORDER — GABAPENTIN 400 MG/1
1 CAPSULE ORAL
Qty: 0 | Refills: 0 | DISCHARGE

## 2019-12-07 RX ORDER — MAGNESIUM OXIDE 400 MG ORAL TABLET 241.3 MG
400 TABLET ORAL AT BEDTIME
Refills: 0 | Status: DISCONTINUED | OUTPATIENT
Start: 2019-12-07 | End: 2019-12-17

## 2019-12-07 RX ORDER — ACETAMINOPHEN 500 MG
650 TABLET ORAL EVERY 6 HOURS
Refills: 0 | Status: DISCONTINUED | OUTPATIENT
Start: 2019-12-07 | End: 2019-12-17

## 2019-12-07 RX ORDER — METOPROLOL TARTRATE 50 MG
0.5 TABLET ORAL
Qty: 0 | Refills: 0 | DISCHARGE

## 2019-12-07 RX ORDER — METOPROLOL TARTRATE 50 MG
12.5 TABLET ORAL ONCE
Refills: 0 | Status: COMPLETED | OUTPATIENT
Start: 2019-12-07 | End: 2019-12-07

## 2019-12-07 RX ORDER — LEVOTHYROXINE SODIUM 125 MCG
25 TABLET ORAL DAILY
Refills: 0 | Status: DISCONTINUED | OUTPATIENT
Start: 2019-12-07 | End: 2019-12-17

## 2019-12-07 RX ORDER — LEVETIRACETAM 250 MG/1
750 TABLET, FILM COATED ORAL
Refills: 0 | Status: DISCONTINUED | OUTPATIENT
Start: 2019-12-07 | End: 2019-12-17

## 2019-12-07 RX ORDER — CEPHALEXIN 500 MG
500 CAPSULE ORAL EVERY 12 HOURS
Refills: 0 | Status: DISCONTINUED | OUTPATIENT
Start: 2019-12-07 | End: 2019-12-17

## 2019-12-07 RX ORDER — SODIUM CHLORIDE 9 MG/ML
1000 INJECTION, SOLUTION INTRAVENOUS
Refills: 0 | Status: DISCONTINUED | OUTPATIENT
Start: 2019-12-07 | End: 2019-12-17

## 2019-12-07 RX ORDER — ASPIRIN/CALCIUM CARB/MAGNESIUM 324 MG
81 TABLET ORAL DAILY
Refills: 0 | Status: DISCONTINUED | OUTPATIENT
Start: 2019-12-07 | End: 2019-12-17

## 2019-12-07 RX ADMIN — Medication 650 MILLIGRAM(S): at 21:20

## 2019-12-07 RX ADMIN — METFORMIN HYDROCHLORIDE 500 MILLIGRAM(S): 850 TABLET ORAL at 18:10

## 2019-12-07 RX ADMIN — MAGNESIUM OXIDE 400 MG ORAL TABLET 400 MILLIGRAM(S): 241.3 TABLET ORAL at 21:45

## 2019-12-07 RX ADMIN — Medication 500 MILLIGRAM(S): at 21:45

## 2019-12-07 RX ADMIN — ESCITALOPRAM OXALATE 10 MILLIGRAM(S): 10 TABLET, FILM COATED ORAL at 21:45

## 2019-12-07 RX ADMIN — LEVETIRACETAM 750 MILLIGRAM(S): 250 TABLET, FILM COATED ORAL at 18:11

## 2019-12-07 RX ADMIN — SODIUM CHLORIDE 75 MILLILITER(S): 9 INJECTION, SOLUTION INTRAVENOUS at 18:07

## 2019-12-07 RX ADMIN — Medication 650 MILLIGRAM(S): at 20:34

## 2019-12-07 RX ADMIN — Medication 81 MILLIGRAM(S): at 18:13

## 2019-12-07 RX ADMIN — Medication 12.5 MILLIGRAM(S): at 18:14

## 2019-12-07 NOTE — ED CDU PROVIDER INITIAL DAY NOTE - ATTENDING CONTRIBUTION TO CARE
I, Dr. Woo, performed a face to face bedside interview with this patient regarding history of present illness, review of symptoms and relevant past medical, social and family history.  I completed an independent physical examination.  I have also reviewed the ACP's note(s) and discussed the plan with the ACP.

## 2019-12-07 NOTE — ED ADULT NURSE NOTE - OBJECTIVE STATEMENT
88 year old female in no acute distress, awake, alert, oriented to self, and place, BIBA from home s/p witnessed syncopal episode while sitting in shower chair being bathed, caught and never fell to ground. Son reports pt was completely unresponsive and he had difficulty obtaining pulse, did CPR until she awoke. Pt at baseline mental status which is short term memory loss and does not recall event which family reports is normal for her. Pt denies pain, cardiac and O2 monitoring in place, family at bedside, fall precautions in place, will monitor.

## 2019-12-07 NOTE — ED CDU PROVIDER INITIAL DAY NOTE - PROGRESS NOTE DETAILS
Loop recorder interrogated with noted frequent Afib with RVR on and off - longest rate 170 x 4 min Loop recorder interrogated with noted frequent Afib in 140s with frequent RVR

## 2019-12-07 NOTE — ED CDU PROVIDER INITIAL DAY NOTE - OBJECTIVE STATEMENT
88y Female with Hx of VCA with left arm weakness, DM2, HTN, Dementia,  Chronic AF (rate in ED high 90s-low 100s)  S/P LARIAT PLACEMENT BIBA after she experienced an episode of syncope at home while was getting a bath by her home aid. While in ED initial w/u negative and admitted to obervation for further syncope work-up. Pt denies any CP, palp or SOB. Denies fevers, chills, cough, N/V 88y Female with Hx of CVA with left arm weakness, DM2, HTN, Dementia,  Chronic AF (rate in ED high 90s-low 100s)  S/P LARIAT PLACEMENT BIBA after she experienced an episode of syncope at home while was getting a bath by her home aid. While in ED initial w/u negative and admitted to obervation for further syncope work-up. Pt denies any CP, palp or SOB. Denies fevers, chills, cough, N/V 88y Female with Hx of CVA with left arm weakness, DM2, HTN, Dementia,  Chronic AF (rate in ED high 90s-low 100s)  S/P LARIAT PLACEMENT BIBA after she experienced an episode of syncope at home while was getting a bath by her home aid. While in ED initial w/u negative and admitted to observation for further syncope work-up. Pt denies any CP, palp or SOB. Denies fevers, chills, cough, N/V

## 2019-12-07 NOTE — ED CDU PROVIDER INITIAL DAY NOTE - MEDICAL DECISION MAKING DETAILS
Syncope- Tele monitoring, serial trop/EKG, Cardiology consult. Pt with known Afib s/p Lariat procedure only on ASA. Pt with noted to be mildly tachycardic in ED- will start on low dose metoprolol and continue to observe

## 2019-12-07 NOTE — ED ADULT TRIAGE NOTE - CHIEF COMPLAINT QUOTE
patient c/o syncope at home while transferring from bathroom to bed. family started CPR when patient woke up and complain of the compressions. patient is confused at baseline. c/o right shoulder pain.

## 2019-12-07 NOTE — ED PROVIDER NOTE - NS ED ROS FT
Review of Systems  •	CONSTITUTIONAL - no  fever, no diaphoresis, no weight change  •	SKIN - no rash  •	HEMATOLOGIC - no bleeding, no bruising  •	EYES - no eye pain, no blurred vision  •	ENT - no change in hearing, no pain  •	RESPIRATORY - no shortness of breath, no cough  •	CARDIAC - no chest pain, no palpitations  •	GI - no abd pain, no nausea, no vomiting, no diarrhea, no constipation, no bleeding  •	GENITO-URINARY - no discharge, no dysuria; no hematuria,   •	ENDO - no polydipsia, no polyuria, no heat/no cold intolerance  •	MUSCULOSKELETAL - no joint pain, no swelling, no redness  •	NEUROLOGIC - no weakness, no headache, no anesthesia, no paresthesias (+) syncope   •	PSYCH - no anxiety, non suicidal, non homicidal, no hallucination, no depression

## 2019-12-07 NOTE — ED PROVIDER NOTE - PROGRESS NOTE DETAILS
trop negative. call out to [facility]. loop recorder interrogated but no call from [employer] yet. I spoke to family, would prefer keeping patient here to ensure "everything is OK". patient in Obs for serial trop and cardiology consult.

## 2019-12-07 NOTE — ED PROVIDER NOTE - OBJECTIVE STATEMENT
89 yo F hx of afib on asa only, loop recorder, stroke (left arm weakness), dementia p/w syncope. She was on the chair and slumped over. no head injury. Family member started CPR for 20 seconds and she returned to her baseline mental status. She has no chest pain, no sob. no abdominal pain. Family report a similar episode prior and said it was vasovagal.     cards:  ([facility]) 89 yo F hx of afib on asa only s/p  lariat procedure, loop recorder, stroke (left arm weakness), dementia p/w syncope. She was on the chair and slumped over. no head injury. Family member started CPR for 20 seconds and she returned to her baseline mental status. She has no chest pain, no sob. no abdominal pain. Family report a similar episode prior and said it was vasovagal.     cards:  ([facility]) 87 yo F hx of afib on asa only s/p  lariat procedure, loop recorder, stroke (left arm weakness), dementia p/w syncope. She was on the chair and slumped over. no head injury. Family member started CPR for 20 seconds and she returned to her baseline mental status. She has no chest pain, no sob. no abdominal pain. Family report a similar episode prior and said it was vasovagal.     cards:  ([facility])

## 2019-12-07 NOTE — ED PROVIDER NOTE - PHYSICAL EXAMINATION
VITAL SIGNS: I have reviewed nursing notes and confirm.  CONSTITUTIONAL: Well-developed; well-nourished; in no acute distress.  SKIN: Skin exam is warm and dry, no acute rash.  HEAD: Normocephalic; atraumatic.  EYES: PERRL, EOM intact; conjunctiva and sclera clear.  ENT: No nasal discharge; airway clear. Throat clear.  NECK: Supple; non tender.    CARD: S1, S2 normal; no murmurs, gallops, or rubs. Regular rate and rhythm.  RESP: No wheezes,  no rales or rhonchi.   ABD:  soft; non-distended; non-tender;   EXT: Normal ROM. No clubbing, cyanosis or edema.  NEURO: Alert, oriented. Grossly unremarkable. No focal deficits. no facial droop, moves all extremities,(+) weakness of left hand (chronic)   PSYCH: Cooperative, appropriate.

## 2019-12-07 NOTE — CONSULT NOTE ADULT - SUBJECTIVE AND OBJECTIVE BOX
[place] [facility]ARD[facility], THE HEART CENTER                                   [address] [place] [address]                                                      PHONE: [phone]                                                         FAX: [phone]  [email or web address]  ---------------------------------------------------------------------------------------------------------------------------------    Reason for Consult: Syncope    HPI:  NIC SUMNER is an 88y Female with Hx of chronic AF S/P ICH S/P LARIAT PLACEMENT , S/P ILR BIBA after she experienced an episode of syncope at home while was getting a bath by her home aid.   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  Atrial fibrillation: s/p lariat  No significant past surgical history      No Known Allergies      MEDICATIONS  (STANDING):    MEDICATIONS  (PRN):      Social History:  Cigarettes:                    Alchohol:                 Illicit Drug Abuse:      REVIEW OF SYSTEMS:    Constitutional: comfortable  ENMT:  No difficulty hearing, tinnitus, vertigo; No sinus or throat pain  Neck: No pain or stiffness  Respiratory: No cough, wheezing, chills or hemoptysis  Cardiovascular: No chest pain, palpitations, shortness of breath, dizziness or leg swelling  Gastrointestinal: No abdominal or epigastric pain. No nausea, vomiting or hematemesis; No diarrhea or constipation. No melena or hematochezia.  Genitourinary: No dysuria, frequency, hematuria or incontinence  Rectal: No pain, hemorrhoids or incontinence  Neurological: weakness    Vital Signs Last 24 Hrs  T(C): 36.4 (07 Dec 2019 10:50), Max: 36.4 (07 Dec 2019 10:50)  T(F): 97.5 (07 Dec 2019 10:50), Max: 97.5 (07 Dec 2019 10:50)  HR: 107 (07 Dec 2019 10:50) (107 - 107)  BP: 146/87 (07 Dec 2019 10:50) (146/87 - 146/87)  BP(mean): --  RR: 18 (07 Dec 2019 10:50) (18 - 18)  SpO2: 98% (07 Dec 2019 10:50) (98% - 98%)  ICU Vital Signs Last 24 Hrs  NIC SUMNER  I&O's Detail    I&O's Summary    Drug Dosing Weight  NIC ENGCATE      PHYSICAL EXAM:  General: Appears well developed, well nourished alert and cooperative.  HEENT: Head; normocephalic, atraumatic.  Eyes: Pupils reactive, cornea wnl.  Neck: Supple, no nodes adenopathy, no NVD or carotid bruit or thyromegaly.  CARDIOVASCULAR: Normal S1 and S2, No murmur, rub, gallop or lift.   LUNGS: No rales, rhonchi or wheeze. Normal breath sounds bilaterally.  ABDOMEN: Soft, nontender without mass or organomegaly. bowel sounds normoactive.  EXTREMITIES: No clubbing, cyanosis or edema. Distal pulses wnl.   SKIN: warm and dry with normal turgor.  NEURO: Alert/oriented x 3/normal motor exam. No pathologic reflexes.    PSYCH: normal affect.        LABS:                        14.5   11.65 )-----------( 201      ( 07 Dec 2019 12:54 )             45.2     12-07    138  |  99  |  13.0  ----------------------------<  108  4.2   |  24.0  |  0.65    Ca    9.8      07 Dec 2019 12:54  Mg     2.1     12-07    TPro  6.6  /  Alb  3.6  /  TBili  0.4  /  DBili  x   /  AST  28  /  ALT  19  /  AlkPhos  80  12-07    NIC SUMNER  CARDIAC MARKERS ( 07 Dec 2019 12:54 )  x     / <0.01 ng/mL / x     / x     / x          PT/INR - ( 07 Dec 2019 12:54 )   PT: 11.9 sec;   INR: 1.03 ratio         PTT - ( 07 Dec 2019 12:54 )  PTT:28.2 sec      RADIOLOGY & ADDITIONAL STUDIES:    INTERPRETATION OF TELEMETRY (personally reviewed):    ECG:    ECHO: < from: TTE Echo Complete w/Doppler (04.27.17 @ 09:57) >  PHYSICIAN INTERPRETATION:  Left Ventricle: The left ventricular internal cavity size is normal.  Global LV systolic function was hyperdynamic. Left ventricular ejection   fraction, by visual estimation, is >75%. Spectral Doppler shows   pseudonormal pattern of left ventricular myocardial filling (Grade II   diastolic dysfunction).  Right Ventricle: Normal right ventricular size and function.  Left Atrium: The left atrium is normal in size.  Right Atrium: The right atrium is normal in size.  Pericardium: There is no evidence of pericardial effusion.  Mitral Valve: Structurally normal mitral valve, with normal leaflet   excursion. There is mild mitral valve prolapse. Mitral leaflet mobility   is normal. There is mild mitral annular calcification. Trace mitral valve   regurgitation is seen.  Tricuspid Valve: The tricuspid valve is normal in structure. Mild   tricuspid regurgitation is visualized.  Aortic Valve: The aortic valve is trileaflet. Trivial aortic valve  regurgitation is seen.  Pulmonic Valve: Structurally normal pulmonic valve, with normal leaflet   excursion. No indication of pulmonic valve regurgitation.  Aorta: The aortic root and ascending aorta are structurally normal, with   no evidence of dilitation.  Pulmonary Artery: The main pulmonary artery is normal in size.  Venous: The inferior vena cava was normal sized, with respiratory size   variation greater than 50%.        Summary:   1. Left ventricular ejection fraction, by visual estimation, is >75%.   2. Hyperdynamic global left ventricular systolic function.   3. Spectral Doppler shows pseudonormal pattern of left ventricular   myocardial filling (Grade II diastolic dysfunction).   4. There is no evidence of pericardial effusion.  5. Mild mitral annular calcification.   6. Mild Mitral valve prolapse.     MD Marianne Electronically signed on 4/27/2017 at 6:47:41 PM       < end of copied text >      STRESS TEST:    CARDIAC CATHETERIZATION:    ACTIVE PROBLEMS:  HEALTH ISSUES - PROBLEM Dx:          Assessment and Plan:        Observation  IVF D5 Half 75 cc/hr x 8 hours  Will interrogate ILR  Serial cardiac markers and EKgs  R/O infection Pt with Hx of recurrent UTIs   Continue present cardiac therapy    EL HEBERT MD, FACC, [place]

## 2019-12-07 NOTE — ED PROVIDER NOTE - CLINICAL SUMMARY MEDICAL DECISION MAKING FREE TEXT BOX
89 yo F hx of afib, CVA with left sided deficit, dementia p/w syncope today. trop negative. probnp 624. cxr clear. EKG is non-specific. will place patient in Obs for serial trop and cardiology consult.

## 2019-12-08 VITALS
OXYGEN SATURATION: 95 % | TEMPERATURE: 98 F | RESPIRATION RATE: 20 BRPM | DIASTOLIC BLOOD PRESSURE: 79 MMHG | SYSTOLIC BLOOD PRESSURE: 119 MMHG | HEART RATE: 80 BPM

## 2019-12-08 PROCEDURE — 71045 X-RAY EXAM CHEST 1 VIEW: CPT

## 2019-12-08 PROCEDURE — 85027 COMPLETE CBC AUTOMATED: CPT

## 2019-12-08 PROCEDURE — 96360 HYDRATION IV INFUSION INIT: CPT

## 2019-12-08 PROCEDURE — 83880 ASSAY OF NATRIURETIC PEPTIDE: CPT

## 2019-12-08 PROCEDURE — 85610 PROTHROMBIN TIME: CPT

## 2019-12-08 PROCEDURE — 99217: CPT

## 2019-12-08 PROCEDURE — 93880 EXTRACRANIAL BILAT STUDY: CPT

## 2019-12-08 PROCEDURE — 96361 HYDRATE IV INFUSION ADD-ON: CPT

## 2019-12-08 PROCEDURE — G0378: CPT

## 2019-12-08 PROCEDURE — 36415 COLL VENOUS BLD VENIPUNCTURE: CPT

## 2019-12-08 PROCEDURE — 85730 THROMBOPLASTIN TIME PARTIAL: CPT

## 2019-12-08 PROCEDURE — 99284 EMERGENCY DEPT VISIT MOD MDM: CPT | Mod: 25

## 2019-12-08 PROCEDURE — 73030 X-RAY EXAM OF SHOULDER: CPT

## 2019-12-08 PROCEDURE — 70450 CT HEAD/BRAIN W/O DYE: CPT

## 2019-12-08 PROCEDURE — 83735 ASSAY OF MAGNESIUM: CPT

## 2019-12-08 PROCEDURE — 81001 URINALYSIS AUTO W/SCOPE: CPT

## 2019-12-08 PROCEDURE — 93005 ELECTROCARDIOGRAM TRACING: CPT

## 2019-12-08 PROCEDURE — 84484 ASSAY OF TROPONIN QUANT: CPT

## 2019-12-08 PROCEDURE — 80053 COMPREHEN METABOLIC PANEL: CPT

## 2019-12-08 RX ORDER — CEPHALEXIN 500 MG
1 CAPSULE ORAL
Qty: 14 | Refills: 0
Start: 2019-12-08 | End: 2019-12-14

## 2019-12-08 RX ADMIN — Medication 500 MILLIGRAM(S): at 05:12

## 2019-12-08 RX ADMIN — Medication 81 MILLIGRAM(S): at 11:11

## 2019-12-08 RX ADMIN — LEVETIRACETAM 750 MILLIGRAM(S): 250 TABLET, FILM COATED ORAL at 05:12

## 2019-12-08 RX ADMIN — SODIUM CHLORIDE 1000 MILLILITER(S): 9 INJECTION, SOLUTION INTRAVENOUS at 00:25

## 2019-12-08 RX ADMIN — METFORMIN HYDROCHLORIDE 500 MILLIGRAM(S): 850 TABLET ORAL at 05:12

## 2019-12-08 RX ADMIN — Medication 25 MICROGRAM(S): at 05:12

## 2019-12-08 RX ADMIN — Medication 12.5 MILLIGRAM(S): at 05:12

## 2019-12-08 NOTE — ED CDU PROVIDER DISPOSITION NOTE - PATIENT PORTAL LINK FT
You can access the FollowMyHealth Patient Portal offered by [facility] by registering at the following website: [email or web address]. By joining [employer]’s FollowMyHealth portal, you will also be able to view your health information using other applications (apps) compatible with our system.

## 2019-12-08 NOTE — ED CDU PROVIDER DISPOSITION NOTE - CLINICAL COURSE
88y Female with Hx of CVA with left arm weakness, DM2, HTN, Dementia,  Chronic AF (rate in ED high 90s-low 100s)  S/P LARIAT PLACEMENT BIBA after she experienced an episode of syncope at home while was getting a bath by her home aid.  Loop recorder interrogated had episodes of a-fib RVR >140.  + UTI on Keflex.  Seen by DR Hayes of cardiology, he does not want TTE, patient can have as an outpatient.  Family at bedside, agrees with plan and will f/u with Dr Cole, PCP.

## 2019-12-08 NOTE — ED ADULT NURSE REASSESSMENT NOTE - GENERAL PATIENT STATE
comfortable appearance/cooperative/family/SO at bedside/improvement verbalized/smiling/interactive/resting/sleeping
comfortable appearance/cooperative

## 2019-12-08 NOTE — ED ADULT NURSE REASSESSMENT NOTE - NS ED NURSE REASSESS COMMENT FT1
Care endorsed to GABRIEL Dorantes. Patient Pending rpt trop/ekg, TTE and US carotid. Patient in NAD. RN with no further questions.
Pt alert and oriented, no apparent distress noted at this time. Pt handed off to Eliu RIOS in stable condition.
Report given and pt endorsed to receiving RN Jihan Diana for follow up and continuitu of care.
assumed care of pt @ 1930, report received from Jihan RIOS, charting as noted. pt AOx4 with forgetfulness. pt in NAD, Vital Signs Stable. pt denies any discomfort at this time. pt awaiting 3rd serial trop/ekg @ 2020. pt outstanding for carotid US and TTE. stretcher locked in lowest position, call bell in reach, side rails up.
pt lying in stretcher in NAD. pt remains afib on cardiac monitor, HR controlled ~ 75 bpm. stretcher locked in lowest position, call bell in reach, side rails up , non skid socks on.
Assumed care of the patient at 1630, Patient transferred to observation unit CDU 9. Family present at the bedside. Patient A&Ox2, pleasant and cooperative. No s/s of distress. Denies CP/SOB or dizziness. VSS. Afib on CM. PIV patent. IVF infusing as per orders. Per family patient ambulatory at home with walker and 1 assist. Patient voiding on bedpan clear yellow urine. Patient pending US Carotid/ECHO and rpt trop/ekgs. Patient and Family in understanding of plan of care. Patient with no further questions for the nurse. Will continue to monitor.

## 2019-12-08 NOTE — ED CDU PROVIDER SUBSEQUENT DAY NOTE - MEDICAL DECISION MAKING DETAILS
syncopal epiosode found to be in afib rvr   pending [facility] consult for am   treating uti keflex

## 2019-12-08 NOTE — ED CDU PROVIDER SUBSEQUENT DAY NOTE - HISTORY
pt in and out of afib rvr [facility] contacted and approved for BB to be given   no chest pain   treating for UTI   [facility] to see in am

## 2019-12-08 NOTE — PROGRESS NOTE ADULT - SUBJECTIVE AND OBJECTIVE BOX
[place] [facility]ARDI[facility], THE HEART CENTER                                   [address] [place] [address]                                                      PHONE: [phone]                                                         FAX: [phone]  [email or web address]  ---------------------------------------------------------------------------------------------------------------------------------    Reason for Consult: Syncope    HPI:  NIC SUMNER is an 88y Female with Hx of chronic AF S/P ICH S/P LARIAT PLACEMENT , S/P ILR BIBA after she experienced an episode of syncope at home while was getting a bath by her home aid.   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  Atrial fibrillation: s/p lariat  No significant past surgical history      No Known Allergies      MEDICATIONS  (STANDING):    MEDICATIONS  (PRN):      Social History:  Cigarettes:                    Alchohol:                 Illicit Drug Abuse:      REVIEW OF SYSTEMS:    Constitutional: comfortable  ENMT:  No difficulty hearing, tinnitus, vertigo; No sinus or throat pain  Neck: No pain or stiffness  Respiratory: No cough, wheezing, chills or hemoptysis  Cardiovascular: No chest pain, palpitations, shortness of breath, dizziness or leg swelling  Gastrointestinal: No abdominal or epigastric pain. No nausea, vomiting or hematemesis; No diarrhea or constipation. No melena or hematochezia.  Genitourinary: No dysuria, frequency, hematuria or incontinence  Rectal: No pain, hemorrhoids or incontinence  Neurological: weakness    Vital Signs Last 24 Hrs  T(C): 36.4 (07 Dec 2019 10:50), Max: 36.4 (07 Dec 2019 10:50)  T(F): 97.5 (07 Dec 2019 10:50), Max: 97.5 (07 Dec 2019 10:50)  HR: 107 (07 Dec 2019 10:50) (107 - 107)  BP: 146/87 (07 Dec 2019 10:50) (146/87 - 146/87)  BP(mean): --  RR: 18 (07 Dec 2019 10:50) (18 - 18)  SpO2: 98% (07 Dec 2019 10:50) (98% - 98%)  ICU Vital Signs Last 24 Hrs  NIC SUMNER  I&O's Detail    I&O's Summary    Drug Dosing Weight  NIC TAISHA      PHYSICAL EXAM:  General: Appears well developed, well nourished alert and cooperative.  HEENT: Head; normocephalic, atraumatic.  Eyes: Pupils reactive, cornea wnl.  Neck: Supple, no nodes adenopathy, no NVD or carotid bruit or thyromegaly.  CARDIOVASCULAR: Normal S1 and S2, No murmur, rub, gallop or lift.   LUNGS: No rales, rhonchi or wheeze. Normal breath sounds bilaterally.  ABDOMEN: Soft, nontender without mass or organomegaly. bowel sounds normoactive.  EXTREMITIES: No clubbing, cyanosis or edema. Distal pulses wnl.   SKIN: warm and dry with normal turgor.  NEURO: Alert/oriented x 3/normal motor exam. No pathologic reflexes.    PSYCH: normal affect.        LABS:                        14.5   11.65 )-----------( 201      ( 07 Dec 2019 12:54 )             45.2     12-07    138  |  99  |  13.0  ----------------------------<  108  4.2   |  24.0  |  0.65    Ca    9.8      07 Dec 2019 12:54  Mg     2.1     12-07    TPro  6.6  /  Alb  3.6  /  TBili  0.4  /  DBili  x   /  AST  28  /  ALT  19  /  AlkPhos  80  12-07    NIC SUMNER  CARDIAC MARKERS ( 07 Dec 2019 12:54 )  x     / <0.01 ng/mL / x     / x     / x          PT/INR - ( 07 Dec 2019 12:54 )   PT: 11.9 sec;   INR: 1.03 ratio         PTT - ( 07 Dec 2019 12:54 )  PTT:28.2 sec      RADIOLOGY & ADDITIONAL STUDIES:    INTERPRETATION OF TELEMETRY (personally reviewed):    ECG:    ECHO: < from: TTE Echo Complete w/Doppler (04.27.17 @ 09:57) >  PHYSICIAN INTERPRETATION:  Left Ventricle: The left ventricular internal cavity size is normal.  Global LV systolic function was hyperdynamic. Left ventricular ejection   fraction, by visual estimation, is >75%. Spectral Doppler shows   pseudonormal pattern of left ventricular myocardial filling (Grade II   diastolic dysfunction).  Right Ventricle: Normal right ventricular size and function.  Left Atrium: The left atrium is normal in size.  Right Atrium: The right atrium is normal in size.  Pericardium: There is no evidence of pericardial effusion.  Mitral Valve: Structurally normal mitral valve, with normal leaflet   excursion. There is mild mitral valve prolapse. Mitral leaflet mobility   is normal. There is mild mitral annular calcification. Trace mitral valve   regurgitation is seen.  Tricuspid Valve: The tricuspid valve is normal in structure. Mild   tricuspid regurgitation is visualized.  Aortic Valve: The aortic valve is trileaflet. Trivial aortic valve  regurgitation is seen.  Pulmonic Valve: Structurally normal pulmonic valve, with normal leaflet   excursion. No indication of pulmonic valve regurgitation.  Aorta: The aortic root and ascending aorta are structurally normal, with   no evidence of dilitation.  Pulmonary Artery: The main pulmonary artery is normal in size.  Venous: The inferior vena cava was normal sized, with respiratory size   variation greater than 50%.        Summary:   1. Left ventricular ejection fraction, by visual estimation, is >75%.   2. Hyperdynamic global left ventricular systolic function.   3. Spectral Doppler shows pseudonormal pattern of left ventricular   myocardial filling (Grade II diastolic dysfunction).   4. There is no evidence of pericardial effusion.  5. Mild mitral annular calcification.   6. Mild Mitral valve prolapse.     MD Marianne Electronically signed on 4/27/2017 at 6:47:41 PM       < end of copied text >      STRESS TEST:    CARDIAC CATHETERIZATION:    ACTIVE PROBLEMS:  HEALTH ISSUES - PROBLEM Dx:          Assessment and Plan:        OK to dc home cardiac wise  Continue present cardiac therapy    EL HEBERT MD, [facility], [facility]

## 2019-12-24 NOTE — ED ADULT NURSE NOTE - NEURO ASSESSMENT
[Erythema] : erythema [Purulent Effusion] : purulent effusion [Bulging] : bulging [Clear Effusion] : clear effusion [Clear Rhinorrhea] : clear rhinorrhea [Supple] : supple [FROM] : full passive range of motion - - - [NL] : normotonic [Cerumen in canal] : cerumen in canal [Left] : (left) [FreeTextEntry3] : Impacted cerumen removed with instrumentation. [de-identified] : + multiple cafe au lait spots

## 2020-01-20 NOTE — PROGRESS NOTE ADULT - PROBLEM/PLAN-2
[facility] [address]  Phone: [phone]  Fax: Snow Cisneros, APRN - CNP        January 20, 2020     Patient: Bennett Multani   YOB: 1961   Date of Visit: 1/20/2020       To Whom it May Concern:    Bennett Multani was seen in my clinic on 1/20/2020. She may return to work on 01/23/2020. Please excuse for 01/20/2020, 01/21/2020 and 01/22/2020. If you have any questions or concerns, please don't hesitate to call.     Sincerely,         ROGELIO Mckeon - CNP
DISPLAY PLAN FREE TEXT

## 2021-12-17 NOTE — ED ADULT TRIAGE NOTE - NS ED NURSE BANDS TYPE
Impression: Open angle with borderline findings, low risk, bilateral: H40.013. Plan: IOP stable. Suspect C/D nerve findings. Positive family history.  Continue Latanoprost qHS Ou Name band;

## 2022-01-05 NOTE — ED ADULT NURSE NOTE - CARDIO ASSESSMENT
--- unable to assess, pt is confused unable to assess, pt is confused/2 = difficulty understanding (not related to language barrier)

## 2022-11-14 NOTE — ED PROVIDER NOTE - CROS ED MUSC ALL NEG
You can access the FollowMyHealth Patient Portal offered by [facility] by registering at the following website: [email or web address]. By joining [employer]’s FollowMyHealth portal, you will also be able to view your health information using other applications (apps) compatible with our system.
- - -

## 2023-04-17 NOTE — DISCHARGE NOTE ADULT - PATIENT PORTAL LINK FT
“You can access the FollowHealth Patient Portal, offered by [facility], by registering with the following website: [email or web address]” no

## 2024-02-12 NOTE — ED ADULT NURSE NOTE - NS ED NURSE DISCH DISPOSITION
From: Sarah Jones  To: Dr. Mora Chris  Sent: 2/12/2024 10:09 AM EST  Subject: Cpap    Just checking to see if [facility] was sent new settings? Thanks Sarah Jones   Discharged

## 2024-06-05 ENCOUNTER — INPATIENT (INPATIENT)
Facility: HOSPITAL | Age: 89
LOS: 2 days | Discharge: HOSPICE | DRG: 871 | End: 2024-06-08
Attending: STUDENT IN AN ORGANIZED HEALTH CARE EDUCATION/TRAINING PROGRAM | Admitting: STUDENT IN AN ORGANIZED HEALTH CARE EDUCATION/TRAINING PROGRAM
Payer: MEDICARE

## 2024-06-05 VITALS
DIASTOLIC BLOOD PRESSURE: 68 MMHG | OXYGEN SATURATION: 95 % | RESPIRATION RATE: 24 BRPM | HEIGHT: 62 IN | TEMPERATURE: 98 F | WEIGHT: 130.07 LBS | SYSTOLIC BLOOD PRESSURE: 122 MMHG | HEART RATE: 133 BPM

## 2024-06-05 LAB
ALBUMIN SERPL ELPH-MCNC: 3.4 G/DL — SIGNIFICANT CHANGE UP (ref 3.3–5.2)
ALP SERPL-CCNC: 69 U/L — SIGNIFICANT CHANGE UP (ref 40–120)
ALT FLD-CCNC: 145 U/L — HIGH
ANION GAP SERPL CALC-SCNC: 19 MMOL/L — HIGH (ref 5–17)
APPEARANCE UR: ABNORMAL
AST SERPL-CCNC: 332 U/L — HIGH
BACTERIA # UR AUTO: ABNORMAL /HPF
BASE EXCESS BLDV CALC-SCNC: -4.2 MMOL/L — LOW (ref -2–3)
BASOPHILS # BLD AUTO: 0.01 K/UL — SIGNIFICANT CHANGE UP (ref 0–0.2)
BASOPHILS NFR BLD AUTO: 0.1 % — SIGNIFICANT CHANGE UP (ref 0–2)
BILIRUB SERPL-MCNC: 0.8 MG/DL — SIGNIFICANT CHANGE UP (ref 0.4–2)
BILIRUB UR-MCNC: ABNORMAL
BUN SERPL-MCNC: 41.6 MG/DL — HIGH (ref 8–20)
CA-I SERPL-SCNC: 1.25 MMOL/L — SIGNIFICANT CHANGE UP (ref 1.15–1.33)
CALCIUM SERPL-MCNC: 10.2 MG/DL — SIGNIFICANT CHANGE UP (ref 8.4–10.5)
CAST: 31 /LPF — HIGH (ref 0–4)
CHLORIDE BLDV-SCNC: 109 MMOL/L — HIGH (ref 96–108)
CHLORIDE SERPL-SCNC: 104 MMOL/L — SIGNIFICANT CHANGE UP (ref 96–108)
CO2 SERPL-SCNC: 20 MMOL/L — LOW (ref 22–29)
COLOR SPEC: SIGNIFICANT CHANGE UP
CREAT SERPL-MCNC: 1.01 MG/DL — SIGNIFICANT CHANGE UP (ref 0.5–1.3)
DIFF PNL FLD: NEGATIVE — SIGNIFICANT CHANGE UP
EGFR: 52 ML/MIN/1.73M2 — LOW
EOSINOPHIL # BLD AUTO: 0 K/UL — SIGNIFICANT CHANGE UP (ref 0–0.5)
EOSINOPHIL NFR BLD AUTO: 0 % — SIGNIFICANT CHANGE UP (ref 0–6)
GAS PNL BLDV: 141 MMOL/L — SIGNIFICANT CHANGE UP (ref 136–145)
GAS PNL BLDV: SIGNIFICANT CHANGE UP
GLUCOSE BLDV-MCNC: 120 MG/DL — HIGH (ref 70–99)
GLUCOSE SERPL-MCNC: 119 MG/DL — HIGH (ref 70–99)
GLUCOSE UR QL: NEGATIVE MG/DL — SIGNIFICANT CHANGE UP
HCO3 BLDV-SCNC: 19 MMOL/L — LOW (ref 22–29)
HCT VFR BLD CALC: 40.2 % — SIGNIFICANT CHANGE UP (ref 34.5–45)
HCT VFR BLDA CALC: 41 % — SIGNIFICANT CHANGE UP
HGB BLD CALC-MCNC: 13.8 G/DL — SIGNIFICANT CHANGE UP (ref 11.7–16.1)
HGB BLD-MCNC: 13.2 G/DL — SIGNIFICANT CHANGE UP (ref 11.5–15.5)
IMM GRANULOCYTES NFR BLD AUTO: 1.2 % — HIGH (ref 0–0.9)
KETONES UR-MCNC: ABNORMAL MG/DL
LACTATE BLDV-MCNC: 5.3 MMOL/L — CRITICAL HIGH (ref 0.5–2)
LACTATE BLDV-MCNC: 6.8 MMOL/L — CRITICAL HIGH (ref 0.5–2)
LEUKOCYTE ESTERASE UR-ACNC: ABNORMAL
LYMPHOCYTES # BLD AUTO: 0.95 K/UL — LOW (ref 1–3.3)
LYMPHOCYTES # BLD AUTO: 10.7 % — LOW (ref 13–44)
MAGNESIUM SERPL-MCNC: 2.2 MG/DL — SIGNIFICANT CHANGE UP (ref 1.8–2.6)
MCHC RBC-ENTMCNC: 31.9 PG — SIGNIFICANT CHANGE UP (ref 27–34)
MCHC RBC-ENTMCNC: 32.8 GM/DL — SIGNIFICANT CHANGE UP (ref 32–36)
MCV RBC AUTO: 97.1 FL — SIGNIFICANT CHANGE UP (ref 80–100)
MONOCYTES # BLD AUTO: 0.8 K/UL — SIGNIFICANT CHANGE UP (ref 0–0.9)
MONOCYTES NFR BLD AUTO: 9 % — SIGNIFICANT CHANGE UP (ref 2–14)
NEUTROPHILS # BLD AUTO: 6.99 K/UL — SIGNIFICANT CHANGE UP (ref 1.8–7.4)
NEUTROPHILS NFR BLD AUTO: 79 % — HIGH (ref 43–77)
NITRITE UR-MCNC: NEGATIVE — SIGNIFICANT CHANGE UP
PCO2 BLDV: 25 MMHG — LOW (ref 39–42)
PH BLDV: 7.49 — HIGH (ref 7.32–7.43)
PH UR: 5.5 — SIGNIFICANT CHANGE UP (ref 5–8)
PLATELET # BLD AUTO: 170 K/UL — SIGNIFICANT CHANGE UP (ref 150–400)
PO2 BLDV: 215 MMHG — HIGH (ref 25–45)
POTASSIUM BLDV-SCNC: 4.5 MMOL/L — SIGNIFICANT CHANGE UP (ref 3.5–5.1)
POTASSIUM SERPL-MCNC: 5.3 MMOL/L — SIGNIFICANT CHANGE UP (ref 3.5–5.3)
POTASSIUM SERPL-SCNC: 5.3 MMOL/L — SIGNIFICANT CHANGE UP (ref 3.5–5.3)
PROT SERPL-MCNC: 6.5 G/DL — LOW (ref 6.6–8.7)
PROT UR-MCNC: 100 MG/DL
RBC # BLD: 4.14 M/UL — SIGNIFICANT CHANGE UP (ref 3.8–5.2)
RBC # FLD: 17 % — HIGH (ref 10.3–14.5)
RBC CASTS # UR COMP ASSIST: 4 /HPF — SIGNIFICANT CHANGE UP (ref 0–4)
SAO2 % BLDV: 100 % — SIGNIFICANT CHANGE UP
SODIUM SERPL-SCNC: 143 MMOL/L — SIGNIFICANT CHANGE UP (ref 135–145)
SP GR SPEC: 1.03 — SIGNIFICANT CHANGE UP (ref 1–1.03)
SQUAMOUS # UR AUTO: 16 /HPF — HIGH (ref 0–5)
TROPONIN T, HIGH SENSITIVITY RESULT: 135 NG/L — HIGH (ref 0–51)
TSH SERPL-MCNC: 1.07 UIU/ML — SIGNIFICANT CHANGE UP (ref 0.27–4.2)
UROBILINOGEN FLD QL: 1 MG/DL — SIGNIFICANT CHANGE UP (ref 0.2–1)
WBC # BLD: 8.86 K/UL — SIGNIFICANT CHANGE UP (ref 3.8–10.5)
WBC # FLD AUTO: 8.86 K/UL — SIGNIFICANT CHANGE UP (ref 3.8–10.5)
WBC UR QL: 76 /HPF — HIGH (ref 0–5)

## 2024-06-05 PROCEDURE — 71260 CT THORAX DX C+: CPT | Mod: 26,MC

## 2024-06-05 PROCEDURE — 74177 CT ABD & PELVIS W/CONTRAST: CPT | Mod: 26,MC

## 2024-06-05 PROCEDURE — 93010 ELECTROCARDIOGRAM REPORT: CPT

## 2024-06-05 PROCEDURE — 99291 CRITICAL CARE FIRST HOUR: CPT

## 2024-06-05 PROCEDURE — 70450 CT HEAD/BRAIN W/O DYE: CPT | Mod: 26,MC

## 2024-06-05 RX ORDER — PIPERACILLIN AND TAZOBACTAM 4; .5 G/20ML; G/20ML
3.38 INJECTION, POWDER, LYOPHILIZED, FOR SOLUTION INTRAVENOUS ONCE
Refills: 0 | Status: COMPLETED | OUTPATIENT
Start: 2024-06-05 | End: 2024-06-05

## 2024-06-05 RX ORDER — SODIUM CHLORIDE 9 MG/ML
500 INJECTION, SOLUTION INTRAVENOUS ONCE
Refills: 0 | Status: COMPLETED | OUTPATIENT
Start: 2024-06-05 | End: 2024-06-05

## 2024-06-05 RX ORDER — METOPROLOL TARTRATE 50 MG
5 TABLET ORAL ONCE
Refills: 0 | Status: COMPLETED | OUTPATIENT
Start: 2024-06-05 | End: 2024-06-05

## 2024-06-05 RX ORDER — CEFTRIAXONE 500 MG/1
1000 INJECTION, POWDER, FOR SOLUTION INTRAMUSCULAR; INTRAVENOUS ONCE
Refills: 0 | Status: COMPLETED | OUTPATIENT
Start: 2024-06-05 | End: 2024-06-05

## 2024-06-05 RX ORDER — DILTIAZEM HCL 120 MG
10 CAPSULE, EXT RELEASE 24 HR ORAL ONCE
Refills: 0 | Status: COMPLETED | OUTPATIENT
Start: 2024-06-05 | End: 2024-06-05

## 2024-06-05 RX ORDER — SODIUM CHLORIDE 9 MG/ML
1000 INJECTION INTRAMUSCULAR; INTRAVENOUS; SUBCUTANEOUS
Refills: 0 | Status: DISCONTINUED | OUTPATIENT
Start: 2024-06-05 | End: 2024-06-06

## 2024-06-05 RX ORDER — SODIUM CHLORIDE 9 MG/ML
1000 INJECTION, SOLUTION INTRAVENOUS ONCE
Refills: 0 | Status: COMPLETED | OUTPATIENT
Start: 2024-06-05 | End: 2024-06-05

## 2024-06-05 RX ORDER — CEFTRIAXONE 500 MG/1
1000 INJECTION, POWDER, FOR SOLUTION INTRAMUSCULAR; INTRAVENOUS ONCE
Refills: 0 | Status: DISCONTINUED | OUTPATIENT
Start: 2024-06-05 | End: 2024-06-05

## 2024-06-05 RX ORDER — VANCOMYCIN HCL 1 G
1000 VIAL (EA) INTRAVENOUS ONCE
Refills: 0 | Status: COMPLETED | OUTPATIENT
Start: 2024-06-05 | End: 2024-06-05

## 2024-06-05 RX ADMIN — Medication 10 MILLIGRAM(S): at 15:17

## 2024-06-05 RX ADMIN — CEFTRIAXONE 1000 MILLIGRAM(S): 500 INJECTION, POWDER, FOR SOLUTION INTRAMUSCULAR; INTRAVENOUS at 15:18

## 2024-06-05 RX ADMIN — SODIUM CHLORIDE 500 MILLILITER(S): 9 INJECTION, SOLUTION INTRAVENOUS at 19:30

## 2024-06-05 RX ADMIN — SODIUM CHLORIDE 500 MILLILITER(S): 9 INJECTION, SOLUTION INTRAVENOUS at 16:21

## 2024-06-05 RX ADMIN — PIPERACILLIN AND TAZOBACTAM 200 GRAM(S): 4; .5 INJECTION, POWDER, LYOPHILIZED, FOR SOLUTION INTRAVENOUS at 18:36

## 2024-06-05 RX ADMIN — SODIUM CHLORIDE 1000 MILLILITER(S): 9 INJECTION, SOLUTION INTRAVENOUS at 16:06

## 2024-06-05 RX ADMIN — SODIUM CHLORIDE 120 MILLILITER(S): 9 INJECTION INTRAMUSCULAR; INTRAVENOUS; SUBCUTANEOUS at 21:56

## 2024-06-05 RX ADMIN — Medication 1000 MILLIGRAM(S): at 21:00

## 2024-06-05 RX ADMIN — SODIUM CHLORIDE 1000 MILLILITER(S): 9 INJECTION, SOLUTION INTRAVENOUS at 15:18

## 2024-06-05 RX ADMIN — SODIUM CHLORIDE 500 MILLILITER(S): 9 INJECTION, SOLUTION INTRAVENOUS at 17:52

## 2024-06-05 RX ADMIN — SODIUM CHLORIDE 500 MILLILITER(S): 9 INJECTION, SOLUTION INTRAVENOUS at 17:00

## 2024-06-05 RX ADMIN — SODIUM CHLORIDE 500 MILLILITER(S): 9 INJECTION, SOLUTION INTRAVENOUS at 17:10

## 2024-06-05 RX ADMIN — Medication 5 MILLIGRAM(S): at 16:21

## 2024-06-05 RX ADMIN — SODIUM CHLORIDE 500 MILLILITER(S): 9 INJECTION, SOLUTION INTRAVENOUS at 18:10

## 2024-06-05 RX ADMIN — PIPERACILLIN AND TAZOBACTAM 3.38 GRAM(S): 4; .5 INJECTION, POWDER, LYOPHILIZED, FOR SOLUTION INTRAVENOUS at 19:36

## 2024-06-05 RX ADMIN — Medication 250 MILLIGRAM(S): at 19:53

## 2024-06-05 NOTE — ED PROVIDER NOTE - CARE PLAN
1 Principal Discharge DX:	Acute UTI  Secondary Diagnosis:	Atrial fibrillation with RVR   Principal Discharge DX:	Acute UTI  Secondary Diagnosis:	Atrial fibrillation with RVR  Secondary Diagnosis:	Bilateral pleural effusion  Secondary Diagnosis:	Other delirium   Principal Discharge DX:	Acute UTI  Secondary Diagnosis:	Atrial fibrillation with RVR  Secondary Diagnosis:	Bilateral pleural effusion  Secondary Diagnosis:	Change in mental status  Secondary Diagnosis:	Colitis

## 2024-06-05 NOTE — ED ADULT NURSE NOTE - NSFALLRISKINTERV_ED_ALL_ED
Assistance OOB with selected safe patient handling equipment if applicable/Assistance with ambulation/Communicate fall risk and risk factors to all staff, patient, and family/Monitor gait and stability/Provide visual cue: yellow wristband, yellow gown, etc/Reinforce activity limits and safety measures with patient and family/Call bell, personal items and telephone in reach/Instruct patient to call for assistance before getting out of bed/chair/stretcher/Non-slip footwear applied when patient is off stretcher/[place] to call system/Physically safe environment - no spills, clutter or unnecessary equipment/Purposeful Proactive Rounding/Room/bathroom lighting operational, light cord in reach

## 2024-06-05 NOTE — ED PROVIDER NOTE - CROS ED NEURO ALL NEG
[facility] Progress Note    Date: 2020    Name: Ally Delarosa    MRN: [id number]         : 1976    Injectafer Infusion     Ms. Huang Gomez arrived ambulatory to [facility], at 1440. Patient was assessed and education was provided. Patient presents today Injectafer infusion, as ordered. Ms. Denis Smith vitals were reviewed and patient was observed for 5 minutes prior to treatment. Visit Vitals  BP (!) 104/59 (BP 1 Location: Left arm, BP Patient Position: At rest)   Pulse 90   Temp 97.2 °F (36.2 °C)   Resp 18     IV site established on first attempt with insertion of 24 g PIV in median vein to anterior aspect of right forearm. Brisk blood return was noted and catheter flushed easily with 10 mL NS. Injectafer 750 mg in 250 mL NS initiated via peripheral IV line, to infuse over 20 minutes, as ordered. Ms. Huang Gomez tolerated the infusion, and had no complaints. No adverse effects noted. PIV flushed with 10 mL NS and catheter removed intact. No bleeding or hematoma noted at site. Gauze dressing applied to site and pressure held x 2 minutes, then wrapped with coban. Patient armband removed and shredded. Ms. Huang Gomez was discharged from [place] [address] in stable condition at 1540. She is to return on 2020 at 1400 for her next appointment.     Ruben Guadalupe, RN, RN  2020  1:46 PM - - -

## 2024-06-05 NOTE — ED PROVIDER NOTE - OBJECTIVE STATEMENT
- - - 89-year-old female past medical history of atrial fibrillation, hypothyroid,  diabetes, CVA with residual left-sided weakness presents with lethargy.  patient was diagnosed with a UTI on May 25 and started on oral antibiotics.  Son at bedside is unsure which antibiotic.  However, he reports over the past several days the patient has become increasingly confused, somnolent, and is having hallucinations.  Patient has associated poor p.o. intake and he believes that she is dehydrated.  No fever, chills, focal weakness

## 2024-06-05 NOTE — ED ADULT TRIAGE NOTE - WEIGHT IN KG
Problem: Skin Integrity:  Goal: Will show no infection signs and symptoms  Description: Will show no infection signs and symptoms  1/27/2022 0734 by Tylor Carvajal RN  Outcome: Ongoing  1/27/2022 0733 by Tylor Carvajal RN  Outcome: Ongoing  Goal: Absence of new skin breakdown  Description: Absence of new skin breakdown  1/27/2022 0734 by Tylor Carvajal RN  Outcome: Ongoing  1/27/2022 0733 by Tylor Carvajal RN  Outcome: Ongoing     Problem: Falls - Risk of:  Goal: Will remain free from falls  Description: Will remain free from falls  1/27/2022 0734 by Tylor Carvajal RN  Outcome: Ongoing  1/27/2022 0733 by Tylor Carvajal RN  Outcome: Ongoing  Goal: Absence of physical injury  Description: Absence of physical injury  1/27/2022 0734 by Tylor Carvajal RN  Outcome: Ongoing  1/27/2022 0733 by Tylor Carvajal RN  Outcome: Ongoing     Problem: Nutrition  Goal: Optimal nutrition therapy  1/27/2022 0734 by Tylor Carvajal RN  Outcome: Ongoing  1/27/2022 0733 by Tylor Carvajal RN  Outcome: Ongoing     Problem: Pain:  Goal: Pain level will decrease  Description: Pain level will decrease  1/27/2022 0734 by Tylor Carvajal RN  Outcome: Ongoing  1/27/2022 0733 by Tylor Carvajal RN  Outcome: Ongoing  Goal: Control of acute pain  Description: Control of acute pain  1/27/2022 0734 by Tylor Carvajal RN  Outcome: Ongoing  1/27/2022 0733 by Tylor Carvajal RN  Outcome: Ongoing  Goal: Control of chronic pain  Description: Control of chronic pain  1/27/2022 0734 by Tylor Carvajal RN  Outcome: Ongoing  1/27/2022 0733 by Tylor Carvajal RN  Outcome: Ongoing     Problem: Breathing Pattern - Ineffective:  Goal: Ability to achieve and maintain a regular respiratory rate will improve  Description: Ability to achieve and maintain a regular respiratory rate will improve  Outcome: Ongoing 59

## 2024-06-05 NOTE — ED ADULT NURSE NOTE - OBJECTIVE STATEMENT
pt BIB son for AMS with UTI. pt has become increasingly confused over the past few days.  As per son, pt has short term memory loss but is oriented at baseline.  Pt only responsive to physical stimuli.  Pt currently being tx for UTI since 5/25.  Also noted to be in rapid afib with HR in 130s.  MD Rome called to bedside.

## 2024-06-05 NOTE — ED PROVIDER NOTE - CLINICAL SUMMARY MEDICAL DECISION MAKING FREE TEXT BOX
pt with AMS due to uti. Appears non-focal in nature and consistent with a toxic metabolic encephalopathy. Pt hypotensive and AV yahaira blocker for afib, but responded well to IV fluids. pt to be admitted to medicine

## 2024-06-05 NOTE — ED ADULT NURSE NOTE - NSICDXPASTMEDICALHX_GEN_ALL_CORE_FT
PAST MEDICAL HISTORY:  Atrial fibrillation s/p lariat    CVA (cerebral vascular accident) left sided weakness    DM2 (diabetes mellitus, type 2)     HTN (hypertension)     Neuropathy

## 2024-06-05 NOTE — ED PROVIDER NOTE - SECONDARY DIAGNOSIS.
Atrial fibrillation with RVR Other delirium Bilateral pleural effusion Change in mental status Colitis

## 2024-06-05 NOTE — ED ADULT NURSE REASSESSMENT NOTE - NS ED NURSE REASSESS COMMENT FT1
rcvd pt from RN AT, pt sent to CC for hypotension and rapid afid, pt arouses to loud verbal stimuli, lethargic A&Ox0 not answering any questions and not following any commands, resp even and unlabored no distress noted, pt placed on cardiac monitor showing afib, pt reposited in stretcher for comfort pillow given, bed in lowest position and side rails up, pt family at bedside educated on plan of care by MD [place] and RN family verbalized understanding
pt lethargic arouses to tactile stimuli, A&Ox0 not answering any questions and not following any commands, resp even and unlabored no distress noted, cardiac monitor in place showing ST, pt cleaned and linen changed skin intact, pt repositioned for comfort, side rails up and bed in lowest position

## 2024-06-05 NOTE — ED ADULT TRIAGE NOTE - CHIEF COMPLAINT QUOTE
Pt brought in by son for dehydration, decreased PO intake . Currently being treated for UTI on antibiotics since 5/25 but has had no improvement in symptoms. Pt's son states that she started to get agitated at home so her gave her  "sleep aid", she is sleepy in triage.

## 2024-06-06 ENCOUNTER — RESULT REVIEW (OUTPATIENT)
Age: 89
End: 2024-06-06

## 2024-06-06 DIAGNOSIS — I51.3 INTRACARDIAC THROMBOSIS, NOT ELSEWHERE CLASSIFIED: ICD-10-CM

## 2024-06-06 DIAGNOSIS — J90 PLEURAL EFFUSION, NOT ELSEWHERE CLASSIFIED: ICD-10-CM

## 2024-06-06 LAB
ALBUMIN SERPL ELPH-MCNC: 2.3 G/DL — LOW (ref 3.3–5.2)
ALP SERPL-CCNC: 51 U/L — SIGNIFICANT CHANGE UP (ref 40–120)
ALT FLD-CCNC: 100 U/L — HIGH
ANION GAP SERPL CALC-SCNC: 19 MMOL/L — HIGH (ref 5–17)
APTT BLD: >200 SEC — CRITICAL HIGH (ref 24.5–35.6)
APTT BLD: >200 SEC — CRITICAL HIGH (ref 24.5–35.6)
AST SERPL-CCNC: 165 U/L — HIGH
BASE EXCESS BLDV CALC-SCNC: -6.3 MMOL/L — LOW (ref -2–3)
BASOPHILS # BLD AUTO: 0.03 K/UL — SIGNIFICANT CHANGE UP (ref 0–0.2)
BASOPHILS NFR BLD AUTO: 0.4 % — SIGNIFICANT CHANGE UP (ref 0–2)
BILIRUB SERPL-MCNC: 0.4 MG/DL — SIGNIFICANT CHANGE UP (ref 0.4–2)
BUN SERPL-MCNC: 34.1 MG/DL — HIGH (ref 8–20)
CA-I SERPL-SCNC: 1.05 MMOL/L — LOW (ref 1.15–1.33)
CALCIUM SERPL-MCNC: 7.9 MG/DL — LOW (ref 8.4–10.5)
CHLORIDE BLDV-SCNC: 110 MMOL/L — HIGH (ref 96–108)
CHLORIDE SERPL-SCNC: 94 MMOL/L — LOW (ref 96–108)
CO2 SERPL-SCNC: 18 MMOL/L — LOW (ref 22–29)
CREAT SERPL-MCNC: 0.7 MG/DL — SIGNIFICANT CHANGE UP (ref 0.5–1.3)
CULTURE RESULTS: SIGNIFICANT CHANGE UP
EGFR: 81 ML/MIN/1.73M2 — SIGNIFICANT CHANGE UP
EOSINOPHIL # BLD AUTO: 0.04 K/UL — SIGNIFICANT CHANGE UP (ref 0–0.5)
EOSINOPHIL NFR BLD AUTO: 0.5 % — SIGNIFICANT CHANGE UP (ref 0–6)
GAS PNL BLDV: 137 MMOL/L — SIGNIFICANT CHANGE UP (ref 136–145)
GAS PNL BLDV: SIGNIFICANT CHANGE UP
GAS PNL BLDV: SIGNIFICANT CHANGE UP
GLUCOSE BLDC GLUCOMTR-MCNC: 135 MG/DL — HIGH (ref 70–99)
GLUCOSE BLDC GLUCOMTR-MCNC: 143 MG/DL — HIGH (ref 70–99)
GLUCOSE BLDC GLUCOMTR-MCNC: 95 MG/DL — SIGNIFICANT CHANGE UP (ref 70–99)
GLUCOSE BLDC GLUCOMTR-MCNC: 97 MG/DL — SIGNIFICANT CHANGE UP (ref 70–99)
GLUCOSE BLDV-MCNC: 115 MG/DL — HIGH (ref 70–99)
GLUCOSE SERPL-MCNC: 433 MG/DL — HIGH (ref 70–99)
HCO3 BLDV-SCNC: 19 MMOL/L — LOW (ref 22–29)
HCT VFR BLD CALC: 32.4 % — LOW (ref 34.5–45)
HCT VFR BLDA CALC: 37 % — SIGNIFICANT CHANGE UP
HGB BLD CALC-MCNC: 12.2 G/DL — SIGNIFICANT CHANGE UP (ref 11.7–16.1)
HGB BLD-MCNC: 10.3 G/DL — LOW (ref 11.5–15.5)
IMM GRANULOCYTES NFR BLD AUTO: 1.7 % — HIGH (ref 0–0.9)
INR BLD: 1.48 RATIO — HIGH (ref 0.85–1.18)
LACTATE BLDV-MCNC: 3.6 MMOL/L — HIGH (ref 0.5–2)
LACTATE SERPL-SCNC: 2.4 MMOL/L — HIGH (ref 0.5–2)
LYMPHOCYTES # BLD AUTO: 0.7 K/UL — LOW (ref 1–3.3)
LYMPHOCYTES # BLD AUTO: 8.7 % — LOW (ref 13–44)
MAGNESIUM SERPL-MCNC: 1.9 MG/DL — SIGNIFICANT CHANGE UP (ref 1.6–2.6)
MCHC RBC-ENTMCNC: 31.8 GM/DL — LOW (ref 32–36)
MCHC RBC-ENTMCNC: 31.9 PG — SIGNIFICANT CHANGE UP (ref 27–34)
MCV RBC AUTO: 100.3 FL — HIGH (ref 80–100)
MONOCYTES # BLD AUTO: 0.33 K/UL — SIGNIFICANT CHANGE UP (ref 0–0.9)
MONOCYTES NFR BLD AUTO: 4.1 % — SIGNIFICANT CHANGE UP (ref 2–14)
NEUTROPHILS # BLD AUTO: 6.77 K/UL — SIGNIFICANT CHANGE UP (ref 1.8–7.4)
NEUTROPHILS NFR BLD AUTO: 84.6 % — HIGH (ref 43–77)
PCO2 BLDV: 35 MMHG — LOW (ref 39–42)
PH BLDV: 7.35 — SIGNIFICANT CHANGE UP (ref 7.32–7.43)
PHOSPHATE SERPL-MCNC: 2.2 MG/DL — LOW (ref 2.4–4.7)
PLATELET # BLD AUTO: 108 K/UL — LOW (ref 150–400)
PO2 BLDV: 113 MMHG — HIGH (ref 25–45)
POTASSIUM BLDV-SCNC: 4.3 MMOL/L — SIGNIFICANT CHANGE UP (ref 3.5–5.1)
POTASSIUM SERPL-MCNC: 3.5 MMOL/L — SIGNIFICANT CHANGE UP (ref 3.5–5.3)
POTASSIUM SERPL-SCNC: 3.5 MMOL/L — SIGNIFICANT CHANGE UP (ref 3.5–5.3)
PROT SERPL-MCNC: 4.5 G/DL — LOW (ref 6.6–8.7)
PROTHROM AB SERPL-ACNC: 16.2 SEC — HIGH (ref 9.5–13)
RBC # BLD: 3.23 M/UL — LOW (ref 3.8–5.2)
RBC # FLD: 16.7 % — HIGH (ref 10.3–14.5)
SAO2 % BLDV: 99.4 % — SIGNIFICANT CHANGE UP
SODIUM SERPL-SCNC: 131 MMOL/L — LOW (ref 135–145)
SPECIMEN SOURCE: SIGNIFICANT CHANGE UP
TROPONIN T, HIGH SENSITIVITY RESULT: 130 NG/L — HIGH (ref 0–51)
WBC # BLD: 8.01 K/UL — SIGNIFICANT CHANGE UP (ref 3.8–10.5)
WBC # FLD AUTO: 8.01 K/UL — SIGNIFICANT CHANGE UP (ref 3.8–10.5)

## 2024-06-06 PROCEDURE — 99222 1ST HOSP IP/OBS MODERATE 55: CPT

## 2024-06-06 PROCEDURE — 99223 1ST HOSP IP/OBS HIGH 75: CPT

## 2024-06-06 PROCEDURE — 99497 ADVNCD CARE PLAN 30 MIN: CPT

## 2024-06-06 PROCEDURE — 93306 TTE W/DOPPLER COMPLETE: CPT | Mod: 26

## 2024-06-06 PROCEDURE — 93010 ELECTROCARDIOGRAM REPORT: CPT

## 2024-06-06 RX ORDER — ASPIRIN/CALCIUM CARB/MAGNESIUM 324 MG
1 TABLET ORAL
Qty: 0 | Refills: 0 | DISCHARGE

## 2024-06-06 RX ORDER — HEPARIN SODIUM 5000 [USP'U]/ML
2000 INJECTION INTRAVENOUS; SUBCUTANEOUS EVERY 6 HOURS
Refills: 0 | Status: DISCONTINUED | OUTPATIENT
Start: 2024-06-06 | End: 2024-06-08

## 2024-06-06 RX ORDER — GABAPENTIN 400 MG/1
1 CAPSULE ORAL
Qty: 0 | Refills: 0 | DISCHARGE

## 2024-06-06 RX ORDER — VANCOMYCIN HCL 1 G
1000 VIAL (EA) INTRAVENOUS EVERY 12 HOURS
Refills: 0 | Status: DISCONTINUED | OUTPATIENT
Start: 2024-06-06 | End: 2024-06-07

## 2024-06-06 RX ORDER — PREGABALIN 225 MG/1
0 CAPSULE ORAL
Qty: 0 | Refills: 0 | DISCHARGE

## 2024-06-06 RX ORDER — DEXTROSE 10 % IN WATER 10 %
125 INTRAVENOUS SOLUTION INTRAVENOUS ONCE
Refills: 0 | Status: DISCONTINUED | OUTPATIENT
Start: 2024-06-06 | End: 2024-06-08

## 2024-06-06 RX ORDER — POLYETHYLENE GLYCOL 3350 17 G/17G
0 POWDER, FOR SOLUTION ORAL
Qty: 0 | Refills: 0 | DISCHARGE

## 2024-06-06 RX ORDER — GLUCAGON INJECTION, SOLUTION 0.5 MG/.1ML
1 INJECTION, SOLUTION SUBCUTANEOUS ONCE
Refills: 0 | Status: DISCONTINUED | OUTPATIENT
Start: 2024-06-06 | End: 2024-06-08

## 2024-06-06 RX ORDER — SODIUM CHLORIDE 9 MG/ML
1000 INJECTION, SOLUTION INTRAVENOUS
Refills: 0 | Status: DISCONTINUED | OUTPATIENT
Start: 2024-06-06 | End: 2024-06-08

## 2024-06-06 RX ORDER — DEXTROSE 50 % IN WATER 50 %
25 SYRINGE (ML) INTRAVENOUS ONCE
Refills: 0 | Status: DISCONTINUED | OUTPATIENT
Start: 2024-06-06 | End: 2024-06-08

## 2024-06-06 RX ORDER — HEPARIN SODIUM 5000 [USP'U]/ML
4500 INJECTION INTRAVENOUS; SUBCUTANEOUS ONCE
Refills: 0 | Status: COMPLETED | OUTPATIENT
Start: 2024-06-06 | End: 2024-06-06

## 2024-06-06 RX ORDER — SODIUM CHLORIDE 9 MG/ML
1000 INJECTION, SOLUTION INTRAVENOUS
Refills: 0 | Status: DISCONTINUED | OUTPATIENT
Start: 2024-06-06 | End: 2024-06-07

## 2024-06-06 RX ORDER — LEVOTHYROXINE SODIUM 125 MCG
0 TABLET ORAL
Qty: 0 | Refills: 0 | DISCHARGE

## 2024-06-06 RX ORDER — CHOLECALCIFEROL (VITAMIN D3) 125 MCG
0 CAPSULE ORAL
Qty: 0 | Refills: 0 | DISCHARGE

## 2024-06-06 RX ORDER — DIGOXIN 250 MCG
1 TABLET ORAL
Qty: 0 | Refills: 0 | DISCHARGE

## 2024-06-06 RX ORDER — METFORMIN HYDROCHLORIDE 850 MG/1
0.5 TABLET ORAL
Qty: 0 | Refills: 0 | DISCHARGE

## 2024-06-06 RX ORDER — HEPARIN SODIUM 5000 [USP'U]/ML
4500 INJECTION INTRAVENOUS; SUBCUTANEOUS EVERY 6 HOURS
Refills: 0 | Status: DISCONTINUED | OUTPATIENT
Start: 2024-06-06 | End: 2024-06-08

## 2024-06-06 RX ORDER — HEPARIN SODIUM 5000 [USP'U]/ML
INJECTION INTRAVENOUS; SUBCUTANEOUS
Qty: 25000 | Refills: 0 | Status: DISCONTINUED | OUTPATIENT
Start: 2024-06-06 | End: 2024-06-08

## 2024-06-06 RX ORDER — LEVETIRACETAM 250 MG/1
0 TABLET, FILM COATED ORAL
Qty: 0 | Refills: 0 | DISCHARGE

## 2024-06-06 RX ORDER — ACETAMINOPHEN 500 MG
650 TABLET ORAL EVERY 6 HOURS
Refills: 0 | Status: DISCONTINUED | OUTPATIENT
Start: 2024-06-06 | End: 2024-06-08

## 2024-06-06 RX ORDER — NYSTATIN CREAM 100000 [USP'U]/G
1 CREAM TOPICAL
Refills: 0 | Status: DISCONTINUED | OUTPATIENT
Start: 2024-06-06 | End: 2024-06-08

## 2024-06-06 RX ORDER — INSULIN LISPRO 100/ML
VIAL (ML) SUBCUTANEOUS EVERY 6 HOURS
Refills: 0 | Status: DISCONTINUED | OUTPATIENT
Start: 2024-06-06 | End: 2024-06-08

## 2024-06-06 RX ORDER — ESCITALOPRAM OXALATE 10 MG/1
1 TABLET, FILM COATED ORAL
Qty: 0 | Refills: 0 | DISCHARGE

## 2024-06-06 RX ORDER — DEXTROSE 50 % IN WATER 50 %
12.5 SYRINGE (ML) INTRAVENOUS ONCE
Refills: 0 | Status: DISCONTINUED | OUTPATIENT
Start: 2024-06-06 | End: 2024-06-08

## 2024-06-06 RX ORDER — ONDANSETRON 8 MG/1
4 TABLET, FILM COATED ORAL EVERY 8 HOURS
Refills: 0 | Status: DISCONTINUED | OUTPATIENT
Start: 2024-06-06 | End: 2024-06-08

## 2024-06-06 RX ORDER — LANOLIN ALCOHOL/MO/W.PET/CERES
3 CREAM (GRAM) TOPICAL AT BEDTIME
Refills: 0 | Status: DISCONTINUED | OUTPATIENT
Start: 2024-06-06 | End: 2024-06-08

## 2024-06-06 RX ORDER — PIPERACILLIN AND TAZOBACTAM 4; .5 G/20ML; G/20ML
3.38 INJECTION, POWDER, LYOPHILIZED, FOR SOLUTION INTRAVENOUS EVERY 8 HOURS
Refills: 0 | Status: DISCONTINUED | OUTPATIENT
Start: 2024-06-06 | End: 2024-06-08

## 2024-06-06 RX ORDER — DEXTROSE 50 % IN WATER 50 %
15 SYRINGE (ML) INTRAVENOUS ONCE
Refills: 0 | Status: DISCONTINUED | OUTPATIENT
Start: 2024-06-06 | End: 2024-06-08

## 2024-06-06 RX ORDER — LEVETIRACETAM 250 MG/1
750 TABLET, FILM COATED ORAL EVERY 12 HOURS
Refills: 0 | Status: DISCONTINUED | OUTPATIENT
Start: 2024-06-06 | End: 2024-06-08

## 2024-06-06 RX ADMIN — Medication 250 MILLIGRAM(S): at 05:14

## 2024-06-06 RX ADMIN — Medication 1 ENEMA: at 10:44

## 2024-06-06 RX ADMIN — HEPARIN SODIUM 900 UNIT(S)/HR: 5000 INJECTION INTRAVENOUS; SUBCUTANEOUS at 11:29

## 2024-06-06 RX ADMIN — HEPARIN SODIUM 4500 UNIT(S): 5000 INJECTION INTRAVENOUS; SUBCUTANEOUS at 02:28

## 2024-06-06 RX ADMIN — HEPARIN SODIUM 700 UNIT(S)/HR: 5000 INJECTION INTRAVENOUS; SUBCUTANEOUS at 19:15

## 2024-06-06 RX ADMIN — HEPARIN SODIUM 1100 UNIT(S)/HR: 5000 INJECTION INTRAVENOUS; SUBCUTANEOUS at 02:33

## 2024-06-06 RX ADMIN — HEPARIN SODIUM 0 UNIT(S)/HR: 5000 INJECTION INTRAVENOUS; SUBCUTANEOUS at 18:13

## 2024-06-06 RX ADMIN — LEVETIRACETAM 750 MILLIGRAM(S): 250 TABLET, FILM COATED ORAL at 18:02

## 2024-06-06 RX ADMIN — SODIUM CHLORIDE 70 MILLILITER(S): 9 INJECTION, SOLUTION INTRAVENOUS at 23:04

## 2024-06-06 RX ADMIN — PIPERACILLIN AND TAZOBACTAM 25 GRAM(S): 4; .5 INJECTION, POWDER, LYOPHILIZED, FOR SOLUTION INTRAVENOUS at 23:05

## 2024-06-06 RX ADMIN — Medication 250 MILLIGRAM(S): at 18:03

## 2024-06-06 RX ADMIN — HEPARIN SODIUM 0 UNIT(S)/HR: 5000 INJECTION INTRAVENOUS; SUBCUTANEOUS at 09:55

## 2024-06-06 RX ADMIN — LEVETIRACETAM 750 MILLIGRAM(S): 250 TABLET, FILM COATED ORAL at 05:14

## 2024-06-06 RX ADMIN — NYSTATIN CREAM 1 APPLICATION(S): 100000 CREAM TOPICAL at 23:04

## 2024-06-06 RX ADMIN — PIPERACILLIN AND TAZOBACTAM 25 GRAM(S): 4; .5 INJECTION, POWDER, LYOPHILIZED, FOR SOLUTION INTRAVENOUS at 06:42

## 2024-06-06 RX ADMIN — PIPERACILLIN AND TAZOBACTAM 25 GRAM(S): 4; .5 INJECTION, POWDER, LYOPHILIZED, FOR SOLUTION INTRAVENOUS at 14:37

## 2024-06-06 NOTE — CONSULT NOTE ADULT - SUBJECTIVE AND OBJECTIVE BOX
CC: dehydration     BRIEF HOSPITAL COURSE: 89F with pmhx afib, hypothyroidism, diabetes, CVA with L sided defecits p/w lethargy. Son had expressed to ED staff that she had become increasingly somnolent confused and was having hallucinations. On arrival to the ED, labs signifigant for AGMA with lactate 5.3, UA weakly positive. CT head negative for acute ICH, revealing of     IMPRESSION:  1.   1.7 x 1.3 cm thrombus is seen in the appendage of the left atrium on  series 3, image 51.  2.   Moderate to large bilateral pleural effusions measuring 4.3 cm on the  right and 3.4 cm on the left and measuring proximally 25% of thoracic   cavity.  Small bilateral lower lobe dependent infiltrates.    Events last 24 hours: ***    ROS:   CONSTITUTIONAL: (-) fever, (-) chills  RESPIRATORY: (-) SOB, (-) cough  CV: (-) chest pain, (-) palpitations  GI: (-) abdominal pain, (-) nausea, (-) vomiting, (-) diarrhea, (-) constipation   NEURO: (-) headache, (-) weakness, (-) visual changes    [  ] Due to altered mental status/intubation, subjective information was not able to be obtained from the patient. History was obtained, to the extent possible, from review of the chart and collateral sources of information.    PMHx:   PSHx:  FamHx:   Social Hx:   Allergies:    PAST MEDICAL & SURGICAL HISTORY:  Atrial fibrillation  s/p lariat      CVA (cerebral vascular accident)  left sided weakness      HTN (hypertension)      DM2 (diabetes mellitus, type 2)      Neuropathy      No significant past surgical history          SOCIAL HISTORY:  EtOH:   Smoking:   Recreational drug use:     Medications:            heparin   Injectable 4500 Unit(s) IV Push every 6 hours PRN  heparin   Injectable 2000 Unit(s) IV Push every 6 hours PRN  heparin  Infusion.  Unit(s)/Hr IV Continuous <Continuous>          sodium chloride 0.9%. 1000 milliLiter(s) IV Continuous <Continuous>                ICU Vital Signs Last 24 Hrs  T(C): 36.9 (2024 17:20), Max: 36.9 (2024 14:11)  T(F): 98.4 (2024 17:20), Max: 98.4 (2024 14:11)  HR: 91 (2024 01:54) (80 - 133)  BP: 99/63 (2024 01:54) (90/61 - 124/68)  BP(mean): 80 (2024 00:42) (72 - 86)  ABP: --  ABP(mean): --  RR: 18 (2024 01:54) (16 - 24)  SpO2: 100% (2024 01:54) (89% - 100%)    O2 Parameters below as of 2024 01:54  Patient On (Oxygen Delivery Method): nasal cannula  O2 Flow (L/min): 3          Physical Examination:    General: Laying in bed comfortably in no acute distress  HEENT: Pupils equal, reactive to light.  Symmetric.  PULM: Clear to auscultation bilaterally, unlabored respirations on room air   CVS: Regular rate and rhythm, no murmurs, rubs, or gallops  ABD: Soft, nondistended, nontender, normoactive bowel sounds, no masses  EXT: No edema, nontender  SKIN: Warm and well perfused  NEURO: interactive, nonfocal         I&O's Detail      LABS:                        13.2   8.86  )-----------( 170      ( 2024 15:07 )             40.2     06-05    143  |  104  |  41.6<H>  ----------------------------<  119<H>  5.3   |  20.0<L>  |  1.01    Ca    10.2      2024 15:07  Mg     2.2     06-05    TPro  6.5<L>  /  Alb  3.4  /  TBili  0.8  /  DBili  x   /  AST  332<H>  /  ALT  145<H>  /  AlkPhos  69  06-05          CAPILLARY BLOOD GLUCOSE        PT/INR - ( 2024 01:41 )   PT: 16.2 sec;   INR: 1.48 ratio           Urinalysis Basic - ( 2024 15:47 )    Color: Dark Yellow / Appearance: Cloudy / S.028 / pH: x  Gluc: x / Ketone: Trace mg/dL  / Bili: Small / Urobili: 1.0 mg/dL   Blood: x / Protein: 100 mg/dL / Nitrite: Negative   Leuk Esterase: Moderate / RBC: 4 /HPF / WBC 76 /HPF   Sq Epi: x / Non Sq Epi: 16 /HPF / Bacteria: Few /HPF      CULTURES:        RADIOLOGY: ***      INVASIVE LINES:  INDWELLING HA:  VTE PROPHYLAXIS:  CODE STATUS:    CRITICAL CARE TIME SPENT: *** minutes spent performing frequent bedside reassessments and augmenting plan of care to address problems of acute critical illness that pose high probability of life threatening deterioration and/or end organ damage/dysfunction and discussing goals of care, non-inclusive of time spent on procedures performed. Date of entry of this note is equal to the date of services rendered.    Admitting 40+/55+/75+  Follow up 25+/35+/50+    Time spent on this patient encounter, which includes documenting this note in the electronic medical record, was * minutes including assessing the presenting problems with associated risks, reviewing the medical record to prepare for the encounter, and meeting face to face with the patient to obtain additional history. I have also performed an appropriate physical exam, made interventions listed and ordered and interpreted appropriate diagnostic studies as documented. To improve communication and patient safety, I have coordinated care with the multidisciplinary team including the bedside nurse, appropriate attending of record and consultants as needed. Date of entry of this note is equal to the date of services rendered. CC: ams    BRIEF HOSPITAL COURSE: 89F with pmhx afib, hypothyroidism, diabetes, CVA with L sided defecits p/w lethargy. Son had expressed to ED staff that she had become increasingly somnolent confused and was having hallucinations. On arrival to the ED, labs signifigant for AGMA with lactate 5.3, UA weakly positive. CT head negative for acute ICH, revealing of chronic findings including chronic infarcts. CT chest + MELCHOR thrombus and b/l mod-large pleural effsions with b/l infiltrates, large stool burden with concern for colitis/stercoral colitis and gatritis. She was mildly hypotensive and given 2.5L of crystalloid. Lactate uptrended. ICU consulted for hypotension.     Events last 24 hours: Seen and examined at bedside. Lethargic but arousable. Does not offer acute complaints.     ROS:   Due to altered mental status/intubation, subjective information was not able to be obtained from the patient. History was obtained, to the extent possible, from review of the chart and collateral sources of information.    PAST MEDICAL & SURGICAL HISTORY:  Atrial fibrillation  s/p lariat  CVA (cerebral vascular accident)  left sided weakness  HTN (hypertension)  DM2 (diabetes mellitus, type 2)  Neuropathy  No significant past surgical history    Medications:  heparin   Injectable 4500 Unit(s) IV Push every 6 hours PRN  heparin   Injectable 2000 Unit(s) IV Push every 6 hours PRN  heparin  Infusion.  Unit(s)/Hr IV Continuous <Continuous>  sodium chloride 0.9%. 1000 milliLiter(s) IV Continuous <Continuous>    ICU Vital Signs Last 24 Hrs  T(C): 36.9 (2024 17:20), Max: 36.9 (2024 14:11)  T(F): 98.4 (2024 17:20), Max: 98.4 (2024 14:11)  HR: 91 (2024 01:54) (80 - 133)  BP: 99/63 (2024 01:54) (90/61 - 124/68)  BP(mean): 80 (2024 00:42) (72 - 86)  ABP: --  ABP(mean): --  RR: 18 (2024 01:54) (16 - 24)  SpO2: 100% (2024 01:54) (89% - 100%)    O2 Parameters below as of 2024 01:54  Patient On (Oxygen Delivery Method): nasal cannula  O2 Flow (L/min): 3    Physical Examination:    General: Laying in bed, nad   HEENT: fights opening eyes   PULM: cta b/l, unlabored on nasal cannula   CVS: Regular rate and rhythm, +s1s2   ABD: Soft, nondistended, nontender  EXT: LLE 1+ pitting edema, rle without sig edema   SKIN: cool distally, dry   NEURO: arsouable to tactile stimuli        I&O's Detail      LABS:                        13.2   8.86  )-----------( 170      ( 2024 15:07 )             40.2     06-05    143  |  104  |  41.6<H>  ----------------------------<  119<H>  5.3   |  20.0<L>  |  1.01    Ca    10.2      2024 15:07  Mg     2.2     06-05    TPro  6.5<L>  /  Alb  3.4  /  TBili  0.8  /  DBili  x   /  AST  332<H>  /  ALT  145<H>  /  AlkPhos  69  06-05          CAPILLARY BLOOD GLUCOSE        PT/INR - ( 2024 01:41 )   PT: 16.2 sec;   INR: 1.48 ratio           Urinalysis Basic - ( 2024 15:47 )    Color: Dark Yellow / Appearance: Cloudy / S.028 / pH: x  Gluc: x / Ketone: Trace mg/dL  / Bili: Small / Urobili: 1.0 mg/dL   Blood: x / Protein: 100 mg/dL / Nitrite: Negative   Leuk Esterase: Moderate / RBC: 4 /HPF / WBC 76 /HPF   Sq Epi: x / Non Sq Epi: 16 /HPF / Bacteria: Few /HPF      CULTURES: bcx x2 and ucx sent         RADIOLOGY: < from: CT Chest w/ IV Cont (24 @ 23:51) >  IMPRESSION:  1.   1.7 x 1.3 cm thrombus is seen in the appendage of the left atrium on  series 3, image 51.  2.   Moderate to large bilateral pleural effusions measuring 4.3 cm on the  right and 3.4 cm on the left and measuring proximally 25% of thoracic   cavity.  Small bilateral lower lobe dependent infiltrates.    < end of copied text >  < from: CT Chest w/ IV Cont (24 @ 23:51) >  IMPRESSION:  1.   Diffuse wall thickening in the ascending and proximal transverse   colon.  Finding is consistent with colitis.  2.   Large amount of stool in the rectum. Rectum measures 6.6 x 6.0 cm.   Mild  haziness and stranding of fat is seen posterior to the rectum.  Stercoral  colitis can have this imaging finding and should be considered in   differential  diagnosis.  3.   Cholelithiasis without evidence of acute cholecystitis.  4.   Diffuse wall thickening in the stomach. Finding is consistent with  gastritis.    < end of copied text >  < from: CT Head No Cont (24 @ 23:51) >    IMPRESSION:    No obvious acute intracranial hemorrhage or mass effect. Ischemic   changes/infarcts as described. If clinically indicated, short-term   follow-up or MRI may be obtained for further evaluation.    Increased opacification of the right mastoid air cell/middle ear cavity.   Recommend clinical correlation to assess acute otomastoiditis.    --- End of Report ---    < end of copied text >      Time spent on this patient encounter, which includes documenting this note in the electronic medical record, was 60 minutes including assessing the presenting problems with associated risks, reviewing the medical record to prepare for the encounter, and meeting face to face with the patient to obtain additional history. I have also performed an appropriate physical exam, made interventions listed and ordered and interpreted appropriate diagnostic studies as documented. To improve communication and patient safety, I have coordinated care with the multidisciplinary team including the bedside nurse, appropriate attending of record and consultants as needed. Date of entry of this note is equal to the date of services rendered.

## 2024-06-06 NOTE — CONSULT NOTE ADULT - CRITICAL CARE ATTENDING COMMENT
Pt seen w PA and agree w above/ PMHx sig for  afib, hypothyroidism, diabetes, CVA with L sided defecits. Sent from NH for weakness, BP low but MAP ~70.  Pt w moderate-large pleural effusions. ?CHF.  Continue abx.  Obtain TTE, hold IVF.  Large stool burden noted, consider GI eval. Reconsult ICU prn.

## 2024-06-06 NOTE — CONSULT NOTE ADULT - ASSESSMENT
89F with pmhx afib, hypothyroidism, diabetes, CVA with L sided defecits p/w lethargy, now found to have:     altered mental status ?toxic metabolic encephalopathy   hypotension   UTI     - BP borderline but responded to IVF and has not required vasopressors  - POCUS performed with what appears to be reduced EF. Full exam limited by pt's abdominal discomfort. scattered b lines b/l ant lung fields and noted effusions on CT scan  - would hold off on additional IVF for now and obtain formal echo  - trop mildly elevated, would trend and check ecg   - lactate now downtrending  - CT chest findings noted with respect to MELCHOR thrombus and b/l pleural effusions; would obtain cardio consult also in light of above  - pt has been seen in consult by thoracic as well although at this time no urgent indication for drainage as respiratory status is stable with minimal O2 requirements   - agree with empiric abx for UTI, de-escalate as cultures allow  - would address GOC/obtain palliative care consult   - remainder of care per admitting team  - VSS; no ICU needs at this time     Dispo: rec admission to medicine can consider SDU for close monitoring of hemodynamics     case d/w ICU attending, Dr. Kuo    89F with pmhx afib, hypothyroidism, diabetes, CVA with L sided defecits p/w lethargy, now found to have:     altered mental status ?toxic metabolic encephalopathy   hypotension   UTI   b/l pleural effusions  bibasilar pneumonia    - BP borderline but responded to IVF and has not required vasopressors  - POCUS performed with what appears to be reduced EF. Full exam limited by pt's abdominal discomfort. scattered b lines b/l ant lung fields and noted effusions on CT scan  - would hold off on additional IVF for now and obtain formal echo  - trop mildly elevated, would trend and check ecg   - lactate now downtrending  - CT chest findings noted with respect to MELCHOR thrombus and b/l pleural effusions; would obtain cardio consult also in light of above  - pt has been seen in consult by thoracic as well although at this time no urgent indication for drainage as respiratory status is stable with minimal O2 requirements   - agree with empiric abx for UTI, de-escalate as cultures allow  - would address GOC/obtain palliative care consult   - remainder of care per admitting team  - VSS; no ICU needs at this time     Dispo: rec admission to medicine can consider SDU for close monitoring of hemodynamics     case d/w ICU attending, Dr. Kuo

## 2024-06-06 NOTE — CONSULT NOTE ADULT - CONSULT REQUESTED DATE/TIME
Detail Level: Detailed
06-Jun-2024 12:38
06-Jun-2024 12:57
Detail Level: Generalized
06-Jun-2024 13:43
06-Jun-2024 01:00
06-Jun-2024 09:13

## 2024-06-06 NOTE — CONSULT NOTE ADULT - TIME BILLING
Time spent with review of chart documents, labs, imaging. Direct patient assessment,  formulation of care plan. Discussion with  Interdisciplinary  team
chart review, patient evaluation

## 2024-06-06 NOTE — CONSULT NOTE ADULT - ASSESSMENT
89F wtih PMHX CVA with residual L sided weakness, AFIB, Hypothyroidism, DM2, Chronic Pain, Seizure Disorder presents to [facility] ER for AMS and Lethargy. Pt dx with recent UTI on 5/25/24 placed on abx (unclear abx but drfirst showed Nitrofurantoin rx). Over the past few days has becomign increasingly confused, somnolent, and hallucinating. Decreased PO intake. Noted to be hypotensive and hypothermic on arrival. Tachycardic on monitor showing AFIB RVR. Given Lopressor 5mg IV and Diltiazem 10mg IV x1 with improvement but developed hypotension. Given 2.5L IVFB LR in ED however recurrently hypotensive. Lactate 6.8 in ED. CTA Chest/Abd/Pelvis with multiple findings. Found to have MELCHOR and started on Heparin gtt. BL moderate-large pleural effusions. IVF lowered and CT Surgery Consulted for possible thora. BL LL infiltrates suspicious for PNA, Stercoral Colitis, and UA with Acute UTI. Given Ceftriaxone/Zosyn/Vanco for empiric IV ABX. Pt seen/examined. Placed on shahriar hugger. IVF and IV ABX adjusted. Very guarded prognosis. Full Code per ED provider discussed with family.     Unable to obtain ROS 2/2 AMS and Severity of Illness.  (06 Jun 2024 05:11)      Acute hypoxic respiratory failure  b/l pleural effusions r/o pna  r/o aspiration  Stercoral colitis  UTI  Hypothermia  Thrombus left atrial appendage      - f/u bcx  - Ua +  - f/u ucx  - f/u pleural fluid analysis  - per son was being treated for uti with macrobid at home  - Continue empiric zosyn and vancomycin  - monitor vanco trough and adjust per pharmacy protocol  - f/u TTE  - Trend Fever  - Trend Leukocytosis  - bowel regimen  - guarded prognosis    d/w palliative    Will Follow

## 2024-06-06 NOTE — CONSULT NOTE ADULT - SUBJECTIVE AND OBJECTIVE BOX
[facility]                                                INFECTIOUS DISEASES  =======================================================                     Nilton Kahn#   Guillermo Mchugh MD#   Pk Cabrales MD*                           Bryanna Barbour MD*   Gaby Delarosa MD*            Diplomates American Board of Internal Medicine & Infectious Diseases                  # [place] Office - Appt - Tel  [phone] Fax [phone]                * [place] Office - Appt - Tel [phone] Fax [phone]                                  Hospital Consult line:  [phone]  =======================================================      MRN-[id number]  NIC SUMNER   HPI:  89F wtih PMHX CVA with residual L sided weakness, AFIB, Hypothyroidism, DM2, Chronic Pain, Seizure Disorder presents to [facility] ER for AMS and Lethargy. Pt dx with recent UTI on 5/25/24 placed on abx (unclear abx but drfirst showed Nitrofurantoin rx). Over the past few days has becomign increasingly confused, somnolent, and hallucinating. Decreased PO intake. Noted to be hypotensive and hypothermic on arrival. Tachycardic on monitor showing AFIB RVR. Given Lopressor 5mg IV and Diltiazem 10mg IV x1 with improvement but developed hypotension. Given 2.5L IVFB LR in ED however recurrently hypotensive. Lactate 6.8 in ED. CTA Chest/Abd/Pelvis with multiple findings. Found to have MELCHOR and started on Heparin gtt. BL moderate-large pleural effusions. IVF lowered and CT Surgery Consulted for possible thora. BL LL infiltrates suspicious for PNA, Stercoral Colitis, and UA with Acute UTI. Given Ceftriaxone/Zosyn/Vanco for empiric IV ABX. Pt seen/examined. Placed on shahriar hugger. IVF and IV ABX adjusted. Very guarded prognosis. Full Code per ED provider discussed with family.     Unable to obtain ROS 2/2 AMS and Severity of Illness.  (06 Jun 2024 05:11)          I have personally reviewed the labs and data; pertinent labs and data are listed in this note; please see below.   =======================================================  Past Medical & Surgical Hx:  =====================  PAST MEDICAL & SURGICAL HISTORY:  Atrial fibrillation  s/p lariat      CVA (cerebral vascular accident)  left sided weakness      HTN (hypertension)      DM2 (diabetes mellitus, type 2)      Neuropathy      No significant past surgical history        Problem List:  ==========  HEALTH ISSUES - PROBLEM Dx:  Atrial thrombus    Bilateral pleural effusion          Social Hx:  =======  no toxic habits currently    FAMILY HISTORY:  No pertinent family history in first degree relatives    no significant family history of immunosuppressive disorders in mother or father   =======================================================    REVIEW OF SYSTEMS:  CONSTITUTIONAL:  No Fever or chills  HEENT:  No diplopia or blurred vision.  No earache, sore throat or runny nose.  CARDIOVASCULAR:  No pressure, squeezing, strangling, tightness, heaviness or aching about the chest, neck, axilla or epigastrium.  RESPIRATORY:  No cough, shortness of breath  GASTROINTESTINAL:  No nausea, vomiting or diarrhea.  GENITOURINARY:  No dysuria, frequency or urgency. No Blood in urine  MUSCULOSKELETAL:  no joint aches, no muscle pain  SKIN:  No change in skin, hair or nails.  NEUROLOGIC:  No Headaches, seizures or weakness.  PSYCHIATRIC:  No disorder of thought or mood.  ENDOCRINE:  No heat or cold intolerance  HEMATOLOGICAL:  No easy bruising or bleeding.    =======================================================  Allergies    No Known Allergies    Intolerances    Antibiotics:  piperacillin/tazobactam IVPB.. 3.375 Gram(s) IV Intermittent every 8 hours  vancomycin  IVPB 1000 milliGRAM(s) IV Intermittent every 12 hours    Other medications:  dextrose 10% Bolus 125 milliLiter(s) IV Bolus once  dextrose 5%. 1000 milliLiter(s) IV Continuous <Continuous>  dextrose 5%. 1000 milliLiter(s) IV Continuous <Continuous>  dextrose 50% Injectable 25 Gram(s) IV Push once  dextrose 50% Injectable 12.5 Gram(s) IV Push once  glucagon  Injectable 1 milliGRAM(s) IntraMuscular once  heparin  Infusion.  Unit(s)/Hr IV Continuous <Continuous>  insulin lispro (ADMELOG) corrective regimen sliding scale   SubCutaneous every 6 hours  levETIRAcetam   Injectable 750 milliGRAM(s) IV Push every 12 hours  sodium chloride 0.9%. 1000 milliLiter(s) IV Continuous <Continuous>     cefTRIAXone Injectable.   1000 milliGRAM(s) IV Push (06-05-24 @ 15:18)    piperacillin/tazobactam IVPB..   25 mL/Hr IV Intermittent (06-06-24 @ 06:42)    piperacillin/tazobactam IVPB...   200 mL/Hr IV Intermittent (06-05-24 @ 18:36)    vancomycin  IVPB   250 mL/Hr IV Intermittent (06-06-24 @ 05:14)    vancomycin  IVPB.   250 mL/Hr IV Intermittent (06-05-24 @ 19:53)      ======================================================  Physical Exam:  ============  T(F): 98 (06 Jun 2024 11:05), Max: 98.4 (05 Jun 2024 14:11)  HR: 98 (06 Jun 2024 11:05)  BP: 101/62 (06 Jun 2024 11:05)  RR: 20 (06 Jun 2024 11:05)  SpO2: 98% (06 Jun 2024 11:05) (89% - 100%)  temp max in last 48H T(F): , Max: 98.4 (06-05-24 @ 14:11)Height (cm): 157.5 (06-05-24 @ 14:11)  Weight (kg): 58.6 (06-06-24 @ 00:42)  BMI (kg/m2): 23.6 (06-06-24 @ 00:42)  BSA (m2): 1.59 (06-06-24 @ 00:42)    General:  No acute distress.  Eye: Pupils are equal, round and reactive to light, Normal conjunctiva.  HENT: Normocephalic, Oral mucosa is moist, No pharyngeal erythema, No sinus tenderness.  Neck: Supple, No lymphadenopathy.  Respiratory: Lungs are clear to auscultation, Respirations are non-labored.  Cardiovascular: Normal rate, Regular rhythm, s1 + s2  Gastrointestinal: Soft, Non-tender, Non-distended, Normal bowel sounds.  Genitourinary: No costovertebral angle tenderness.  Lymphatics: No lymphadenopathy neck,   Musculoskeletal: Normal range of motion, Normal strength.  Integumentary: No rash.  Neurologic: Alert, Oriented, No focal deficits  Psychiatric: Appropriate mood & affect.    =======================================================  Labs:                        10.3   8.01  )-----------( 108      ( 06 Jun 2024 08:18 )             32.4     06-06    131<L>  |  94<L>  |  34.1<H>  ----------------------------<  433<H>  3.5   |  18.0<L>  |  0.70    Ca    7.9<L>      06 Jun 2024 08:18  Phos  2.2     06-06  Mg     1.9     06-06    TPro  4.5<L>  /  Alb  2.3<L>  /  TBili  0.4  /  DBili  x   /  AST  165<H>  /  ALT  100<H>  /  AlkPhos  51  06-06                                                        [facility]                                                INFECTIOUS DISEASES  =======================================================                     Nilton Kahn#   Guillermo Mchugh MD#   Pk Cabrales MD*                           Bryanna Barbour MD*   Gaby Delarosa MD*            Diplomates American Board of Internal Medicine & Infectious Diseases                  # [place] Office - Appt - Tel  [phone] Fax [phone]                * [place] Office - Appt - Tel [phone] Fax [phone]                                  Hospital Consult line:  [phone]  =======================================================      MRN-[id number]  NIC SUMNER   HPI:  89F wtih PMHX CVA with residual L sided weakness, AFIB, Hypothyroidism, DM2, Chronic Pain, Seizure Disorder presents to [facility] ER for AMS and Lethargy. Pt dx with recent UTI on 5/25/24 placed on abx (unclear abx but drfirst showed Nitrofurantoin rx). Over the past few days has becomign increasingly confused, somnolent, and hallucinating. Decreased PO intake. Noted to be hypotensive and hypothermic on arrival. Tachycardic on monitor showing AFIB RVR. Given Lopressor 5mg IV and Diltiazem 10mg IV x1 with improvement but developed hypotension. Given 2.5L IVFB LR in ED however recurrently hypotensive. Lactate 6.8 in ED. CTA Chest/Abd/Pelvis with multiple findings. Found to have MELCHOR and started on Heparin gtt. BL moderate-large pleural effusions. IVF lowered and CT Surgery Consulted for possible thora. BL LL infiltrates suspicious for PNA, Stercoral Colitis, and UA with Acute UTI. Given Ceftriaxone/Zosyn/Vanco for empiric IV ABX. Pt seen/examined. Placed on shahriar hugger. IVF and IV ABX adjusted. Very guarded prognosis. Full Code per ED provider discussed with family.     Unable to obtain ROS 2/2 AMS and Severity of Illness.  (06 Jun 2024 05:11)          I have personally reviewed the labs and data; pertinent labs and data are listed in this note; please see below.   =======================================================  Past Medical & Surgical Hx:  =====================  PAST MEDICAL & SURGICAL HISTORY:  Atrial fibrillation  s/p lariat      CVA (cerebral vascular accident)  left sided weakness      HTN (hypertension)      DM2 (diabetes mellitus, type 2)      Neuropathy      No significant past surgical history        Problem List:  ==========  HEALTH ISSUES - PROBLEM Dx:  Atrial thrombus    Bilateral pleural effusion          Social Hx:  =======  no toxic habits currently    FAMILY HISTORY:  No pertinent family history in first degree relatives    no significant family history of immunosuppressive disorders in mother or father   =======================================================    REVIEW OF SYSTEMS:  cannot obtained    =======================================================  Allergies    No Known Allergies    Intolerances    Antibiotics:  piperacillin/tazobactam IVPB.. 3.375 Gram(s) IV Intermittent every 8 hours  vancomycin  IVPB 1000 milliGRAM(s) IV Intermittent every 12 hours    Other medications:  dextrose 10% Bolus 125 milliLiter(s) IV Bolus once  dextrose 5%. 1000 milliLiter(s) IV Continuous <Continuous>  dextrose 5%. 1000 milliLiter(s) IV Continuous <Continuous>  dextrose 50% Injectable 25 Gram(s) IV Push once  dextrose 50% Injectable 12.5 Gram(s) IV Push once  glucagon  Injectable 1 milliGRAM(s) IntraMuscular once  heparin  Infusion.  Unit(s)/Hr IV Continuous <Continuous>  insulin lispro (ADMELOG) corrective regimen sliding scale   SubCutaneous every 6 hours  levETIRAcetam   Injectable 750 milliGRAM(s) IV Push every 12 hours  sodium chloride 0.9%. 1000 milliLiter(s) IV Continuous <Continuous>     cefTRIAXone Injectable.   1000 milliGRAM(s) IV Push (06-05-24 @ 15:18)    piperacillin/tazobactam IVPB..   25 mL/Hr IV Intermittent (06-06-24 @ 06:42)    piperacillin/tazobactam IVPB...   200 mL/Hr IV Intermittent (06-05-24 @ 18:36)    vancomycin  IVPB   250 mL/Hr IV Intermittent (06-06-24 @ 05:14)    vancomycin  IVPB.   250 mL/Hr IV Intermittent (06-05-24 @ 19:53)      ======================================================  Physical Exam:  ============  T(F): 98 (06 Jun 2024 11:05), Max: 98.4 (05 Jun 2024 14:11)  HR: 98 (06 Jun 2024 11:05)  BP: 101/62 (06 Jun 2024 11:05)  RR: 20 (06 Jun 2024 11:05)  SpO2: 98% (06 Jun 2024 11:05) (89% - 100%)  temp max in last 48H T(F): , Max: 98.4 (06-05-24 @ 14:11)Height (cm): 157.5 (06-05-24 @ 14:11)  Weight (kg): 58.6 (06-06-24 @ 00:42)  BMI (kg/m2): 23.6 (06-06-24 @ 00:42)  BSA (m2): 1.59 (06-06-24 @ 00:42)    General:  frail elderly lady laying in bed on o2  Neck: Supple, No lymphadenopathy.  Respiratory: decreased breath sounds to auscultation, Respirations are non-labored.  Cardiovascular: Normal rate, Regular rhythm, s1 + s2  Gastrointestinal: Soft, Non-tender, Non-distended, Normal bowel sounds.  Genitourinary: No costovertebral angle tenderness.  Integumentary: No rash.  Neurologic: lethargic    =======================================================  Labs:                        10.3   8.01  )-----------( 108      ( 06 Jun 2024 08:18 )             32.4     06-06    131<L>  |  94<L>  |  34.1<H>  ----------------------------<  433<H>  3.5   |  18.0<L>  |  0.70    Ca    7.9<L>      06 Jun 2024 08:18  Phos  2.2     06-06  Mg     1.9     06-06    TPro  4.5<L>  /  Alb  2.3<L>  /  TBili  0.4  /  DBili  x   /  AST  165<H>  /  ALT  100<H>  /  AlkPhos  51  06-06       < from: CT Abdomen and Pelvis w/ IV Cont (06.05.24 @ 23:52) >  FINDINGS:    CHEST:    LUNGS AND LARGE AIRWAYS: PLEURA:    There is mild bilateral interlobular septal thickening, which could   reflect interstitial edema.    There are moderate-sized bilateral pleural effusions with compressive   atelectasis lower lobes.  The central airways are patent.    VESSELS: Atherosclerotic changes thoracic aorta and coronary artery   calcification.    HEART:  Left atrial enlargement. 1.5 cm filling defect within the left atrial   appendage compatible with thrombus.   No pericardial effusion.    MEDIASTINUM AND DARIN:  Small, subcentimeter short axis mediastinal lymph nodes.    There is a mildly patulous esophagus.    CHEST WALL AND LOWER NECK: Within normal limits.    ABDOMEN AND PELVIS:    LIVER: Within normal limits.  BILE DUCTS: Normal caliber.  GALLBLADDER: Dependent biliary sludge and/or cholelithiasis.  SPLEEN: Within normal limits.  PANCREAS: Atrophic.  ADRENALS: Bilateral adrenal thickening.  KIDNEYS/URETERS:  Bilateral renal hypodensities are too small for characterization.  No hydronephrosis.  Mild right perinephric stranding.    BLADDER: Within normal limits.  REPRODUCTIVE ORGANS:  The uterus and adnexa appear within normal limits.    BOWEL:  Colonic wall thickening ascending colon through the hepatic flexure.  Bowel obstruction.  Distended, fecal impacted rectum with mild rectal wall thickening and   perirectal/presacral stranding.  Sigmoid diverticulosis, without CT evidence of diverticulitis.   Appendix is normal.  PERITONEUM/RETROPERITONEUM:  Trace ascites.    VESSELS: Atherosclerotic changes.  Infrarenal abdominal aortic ectasia measuring 2.4 cm.    LYMPH NODES: No lymphadenopathy.    ABDOMINAL WALL: Within normal limits.    BONES: Degenerative changes.    IMPRESSION:    Moderate-sized bilateral pleural effusions with compressive atelectasis.    Septal thickening suggestive of interstitial edema.    Thrombus left atrial appendage.  This finding was communicated by the [facility] radiologist Dr.Amir Lira at   1:36 AM on 6/6/2024.    Right-sided colitis.  Findings suggestive of a stercoral proctocolitis.  Recommend further clinical correlation.    Other findings as discussed above.    This study was preliminary reported by [facility].    --- End of Report ---        < end of copied text >

## 2024-06-06 NOTE — PROVIDER CONTACT NOTE (OTHER) - BACKGROUND
Pt admitted for intracardiac thrombosis. Multiple skin issues found on patient upon arrival to unit and documented by night shift RN

## 2024-06-06 NOTE — H&P ADULT - TIME BILLING
Labs/Imaging/Notes independently reviewed by me. Med rec completed. ORders placed. Discussed with RN and ED Provider. MICU Consulted. CT Surgery COnsulted. ID Consulted. Cardio Consulted. Palliative Care consulted.

## 2024-06-06 NOTE — H&P ADULT - NSHPLABSRESULTS_GEN_ALL_CORE
CTH WO IMPRESSION:  No obvious acute intracranial hemorrhage or mass effect. Ischemic   changes/infarcts as described. If clinically indicated, short-term   follow-up or MRI may be obtained for further evaluation.  Increased opacification of the right mastoid air cell/middle ear cavity.   Recommend clinical correlation to assess acute otomastoiditis.    CT Chest IVC IMPRESSION:  1.   1.7 x 1.3 cm thrombus is seen in the appendage of the left atrium on  series 3, image 51.  2.   Moderate to large bilateral pleural effusions measuring 4.3 cm on the  right and 3.4 cm on the left and measuring proximally 25% of thoracic   cavity. Small bilateral lower lobe dependent infiltrates.    CT ABDPEL IVC IMPRESSION:  1.   Diffuse wall thickening in the ascending and proximal transverse   colon. Finding is consistent with colitis.  2.   Large amount of stool in the rectum. Rectum measures 6.6 x 6.0 cm.   Mild haziness and stranding of fat is seen posterior to the rectum.  Stercoral  colitis can have this imaging finding and should be considered in   differential diagnosis.  3.   Cholelithiasis without evidence of acute cholecystitis.  4.   Diffuse wall thickening in the stomach. Finding is consistent with  gastritis.

## 2024-06-06 NOTE — GOALS OF CARE CONVERSATION - ADVANCED CARE PLANNING - CONVERSATION DETAILS
Pt 's condition guarded , daughter Evelin Son Krunal was at the bedside discussed earlier re advance directives and HCP   Evelin states that her sister older daughter Miracle in charge and contacted by them over the phone ( she is [place] , not able to provide any HCP paper , son Jamel also present while calling Miracle daughter out of state with Evelin and Krunal during my conversation family requested to call Miracle over the phone and complete MOLst form togetjer   as per kids they spoke and due to pt's multiple medical condition recently declining poor oral intake . They do not want their mom suffering and goes with dignity and peace when it is time   MOLST form reviewed and filled , DNR , DNI , no artifical feed , no HD , trial of IVF , chritsiano for antibiotocs   Family is also aware she is pending TTE , may need CT thoracentesis and if she does not improve and worsens may consider comfort , hospice   continue medical treatment as outlined on admission     emotional support given

## 2024-06-06 NOTE — CONSULT NOTE ADULT - ASSESSMENT
89yFemale with h/o AFib, hypothyroidism, NIDDM2, CVA with residual left sided weakness, who presented to [facility] ED c/o AMS, hallucinations, somnolence, decreased po intake.  Pt with recent UTI, started on keflex.  In ED, pt found to be in AF RVR.  given cardizem 10 mg IV x 1, lopressor 5 mg IV x 1 with rate control achieved.  Given rocephin, zosyn, and vanc in ED.  CT chest, abdomen, pelvis with bilateral pleural effusions.  Thoracic Surgery consulted for further evaluation.

## 2024-06-06 NOTE — CONSULT NOTE ADULT - PROBLEM SELECTOR RECOMMENDATION 9
CT Chest with bilateral pleural effusions  Pt stable on 3L NC, in no respiratory distress, in rate controlled AFib.  Pt started on heparin gtt.  Will plan for potential chest tube later today. Will need to hold heparin gtt prior to chest tube placement.  Will consider placing a chest tube sooner if patient experiences increased work of breathing, SpO2 starts to trend down, O2 requirements start to trend up.  Thoracic Surgery will continue to follow  Rest of care per primary team    Plan to be discussed with Thoracic Surgery attendings during AM rounds.

## 2024-06-06 NOTE — INPATIENT CERTIFICATION FOR MEDICARE PATIENTS - THE STATUS OF COMORBIDITIES.
Referring provider: Ulysses Alford OD  Primary eye provider: Ulysses Alford OD  Primary care provider:Carlos Heredia MD    Does Mr. Campbell have  today: yes  Mr. Campbell gave us verbal permission to discuss their medical condition in the presence of the following individual(s) in the room: Danelle Rivera      Last dilated exam each eye:  3/1/2022  Last comprehensive exam:  3/1/2022  Last fluorescein angiogram:  6/8/21    CHIEF COMPLAINT/HPI (technician): 6 week return  for central retinal vein occlusion with macular edema of the right eye and Eylea injection left eye. Patient states vision is stable.       HISTORY OF PRESENT ILLNESS (DOCTOR):  Mr. Campbell is a 86 year old patient here for 4 week return for central retinal vein occlusion with macular edema of the right eye.  I reviewed and agree with the history as noted above.  They are inquiring about the brow laceration.  He does note there was some numbness in the area.    The patient was diagnosed with diabetes at age 70?. They check their blood sugars PRN. Their blood sugar control is adequate.     Hemoglobin A1C (%)   Date Value   11/30/2019 5.8 (H)     Last blood sugar was unknown. States he doesn't check it at home (11/7/2022)      PAST OCULAR HISTORY:  Pseudophakia Both Eyes   Glaucoma suspect both eyes  Central Retinal Vein Occlusion Right Eye-present since at least 1/6/21      PAST OCULAR PROCEDURES:  No heart attack, had a mini stroke about 3 years ago: updated 11/07/22    RIGHT EYE:   Cataract Extraction: Right Eye  Avastin: 2/4/21, 3/9/21, 4/6/21, 5/6/21, 7/6/21,8-6-21(martin),9-14-21, 10/18/21  Eylea: 12/17/21, 1/17/22, 3/1/22, 4/5/22, 5/10/22, 7/22/22, 8/30/22, 9/27/22, 11/7/22    LEFT EYE:   Cataract Extraction: Left Eye      CURRENT EYE MEDICATION:  Current eye drops - Latanoprost Both Eyes at bedtime    Base Eye Exam     Visual Acuity (Snellen - Linear)       Right Left    Dist cc 20/25 +2 20/20 -1    Correction: Glasses           Tonometry (Applanation, 3:26 PM)       Right Left    Pressure 14 14          Pupils       Pupils APD    Right PERRL Negative    Left PERRL Negative          Neuro/Psych     Oriented x3: Yes    Mood/Affect: Normal          Dilation     Both eyes: 1.0% Tropicamide, 10% Neosynephrine @ 3:26 PM            Slit Lamp and Fundus Exam     External Exam       Right Left    External Normal By laceration, well approximated.  Does have diminished brow raise          Slit Lamp Exam       Right Left    Lids/Lashes 2+ Dermatochalasis - upper lid Normal    Conjunctiva/Sclera Clear Clear    Cornea Clear Clear    Anterior Chamber Decreased tear break-up time     Iris Round and regular, no neovascularization of the iris or angles     Lens Posterior chamber intraocular lens, Open posterior capsule     Vitreous Posterior vitreous detachment           Fundus Exam       Right Left    Disc Minimal residual hemorrhage.  Otherwise pink, flat, clear.  No neovascularization, no shunt vessels at this stage     C/D Ratio 0.6     Macula No hemorrhage/exudate/fluid visible     Vessels Tortuous     Periphery Flat, healthy, without tears or detachments                 TEST: Ocular Coherence Tomography - Macula      Indication for test:  Central retinal vein occlusion of the right eye with macular edema.    Impression:       Right eye:  Central sub field thickness 306.  There is no intra or subretinal fluid visible today.          PROCEDURE PERFORMED: Intravitreal Eylea Right Eye    INDICATIONS: macular edema right eye    ANESTHESIA:  Proparacaine 0.5%, Tetracaine 0.5% and Subconjunctival lidocaine    SPECIMEN: None    COMPLICATIONS: None    ESTIMATED BLOOD LOSS: None    DESCRIPTION OF PROCEDURE: Treatment options were reviewed with the patient and after our discussion they elected to proceed.    The above-mentioned topical anesthetic was placed into the eye for approximately 5 minutes. An eyelid speculum was placed and the eye was then swabbed with  povidone iodine 5%. 0.1 CC of Lidocaine was then injected subconjunctivally in the inferotemporal bulbar conjunctiva. This was allowed to sit for 5 minutes. The eye was again swabbed with Povidone Iodine 5%. Eylea was injected 3.5-4.0 mm from the limbus in the inferotemporal quadrant.     Their vision was checked and confirmed to be at least count fingers before the completion of the visit.    DISPOSITION: The patient tolerated the procedure well Without complication.           ASSESSMENT/PLAN:  1. Central retinal vein occlusion with macular edema, right eye.  Confirmed by angiography.  No fluid today..  We reviewed treatment options and he has elected to continue with the Eylea injection.  Please see my operative note above.      2. Nonexudative macular degeneration, each eye.  Continue to monitor both eyes with the Amsler grid or equivalent.      3.  Ocular hypertension, left eye.  Intra-ocular pressure is good.  Continue present management. May consider a switch from the latanoprost to a different class of medications.    4. Brow laceration.  Looks to be well approximated.  He may have some residual numbness that may or may not improve.  No changes in management.     DROPS:    1. Latanoprost-1 drop to both eyes at bedtime.    Follow-up in 5-6 weeks for central retinal vein occlusion with macular edema of the right eye.  Ocular coherence tomography of the macula in the right eye.  Likely Eylea in the right eye.     2. The status of comorbities. (See ED/admit documents)

## 2024-06-06 NOTE — CONSULT NOTE ADULT - SUBJECTIVE AND OBJECTIVE BOX
[place] [facility] - [facility]                                   [address] [place] [address]                                                      PHONE: [phone]                                                         FAX: [phone]  [email or web address]  ---------------------------------------------------------------------------------------------------------------------------------    Reason for Consult: + trop     HPI:  NIC SUMNER is an 89y Female with history of dementia stoke infraction with history of ICH  with residual L sided weakness, Chronic AF not on AC due to fall and bleeding risk s/p LARIAT [facility] , Hypothyroidism, DM2, Seizure Disorder presents to [facility] ER for AMS confusion .  History was obtained from chart and son who is at bedside. Patient was found to have recent UTI on 24 placed on abx. Over the past few days has becoming increasingly confused, somnolent, and hallucinating with decreased PO intake. Noted to be hypotensive and hypothermic on arrival and AF RVR. Given Lopressor 5mg IV and Diltiazem 10mg IV x1 with improvement but developed hypotension s/p 2.5L IVF bolus. Lactate 6.8 in ED. CTA Chest/Abd/Pelvis showed 1.   1.7 x 1.3 cm thrombus is seen in the appendage of the left atrium, Moderate to large bilateral pleural effusions, Small bilateral lower lobe dependent infiltrates.  The patient was started on Heparin gtt.  CT surgery called and awaiting possible chest tube placement.         PAST MEDICAL & SURGICAL HISTORY:  Atrial fibrillation  s/p lariat      CVA (cerebral vascular accident)  left sided weakness      HTN (hypertension)      DM2 (diabetes mellitus, type 2)      Neuropathy      No significant past surgical history          No Known Allergies      MEDICATIONS  (STANDING):  dextrose 10% Bolus 125 milliLiter(s) IV Bolus once  dextrose 5%. 1000 milliLiter(s) (100 mL/Hr) IV Continuous <Continuous>  dextrose 5%. 1000 milliLiter(s) (50 mL/Hr) IV Continuous <Continuous>  dextrose 50% Injectable 25 Gram(s) IV Push once  dextrose 50% Injectable 12.5 Gram(s) IV Push once  glucagon  Injectable 1 milliGRAM(s) IntraMuscular once  heparin  Infusion.  Unit(s)/Hr (11 mL/Hr) IV Continuous <Continuous>  insulin lispro (ADMELOG) corrective regimen sliding scale   SubCutaneous every 6 hours  levETIRAcetam   Injectable 750 milliGRAM(s) IV Push every 12 hours  piperacillin/tazobactam IVPB.. 3.375 Gram(s) IV Intermittent every 8 hours  saline laxative (FLEET) Rectal Enema 1 Enema Rectal once  sodium chloride 0.9%. 1000 milliLiter(s) (75 mL/Hr) IV Continuous <Continuous>  vancomycin  IVPB 1000 milliGRAM(s) IV Intermittent every 12 hours    MEDICATIONS  (PRN):  acetaminophen     Tablet .. 650 milliGRAM(s) Oral every 6 hours PRN Temp greater or equal to 38C (100.4F), Mild Pain (1 - 3)  aluminum hydroxide/magnesium hydroxide/simethicone Suspension 30 milliLiter(s) Oral every 4 hours PRN Dyspepsia  dextrose Oral Gel 15 Gram(s) Oral once PRN Blood Glucose LESS THAN 70 milliGRAM(s)/deciliter  heparin   Injectable 2000 Unit(s) IV Push every 6 hours PRN For aPTT between 40 - 57  heparin   Injectable 4500 Unit(s) IV Push every 6 hours PRN For aPTT less than 40  melatonin 3 milliGRAM(s) Oral at bedtime PRN Insomnia  ondansetron Injectable 4 milliGRAM(s) IV Push every 8 hours PRN Nausea and/or Vomiting      Social History:  Cigarettes:          none         Alchohol:     none             Illicit Drug Abuse:  none    ROS: Negative other than as mentioned in HPI.    Vital Signs Last 24 Hrs  T(C): 35.2 (2024 05:12), Max: 36.9 (2024 14:11)  T(F): 95.4 (2024 05:12), Max: 98.4 (2024 14:11)  HR: 97 (2024 08:36) (80 - 133)  BP: 82/52 (2024 08:36) (82/52 - 124/68)  BP(mean): 80 (2024 00:42) (72 - 86)  RR: 24 (2024 08:36) (16 - 24)  SpO2: 91% (2024 08:36) (89% - 100%)    Parameters below as of 2024 08:36  Patient On (Oxygen Delivery Method): nasal cannula  O2 Flow (L/min): 3    ICU Vital Signs Last 24 Hrs  NIC SUMNER  I&O's Detail    I&O's Summary    Drug Dosing Weight  NIC RAMSAYGENETWILBERT      PHYSICAL EXAM:  General: Appears well developed, alert and cooperative.  HEENT: Head; normocephalic, atraumatic.  Eyes: Pupils reactive, cornea wnl.  Neck: Supple, no nodes adenopathy, no NVD or carotid bruit or thyromegaly.  CARDIOVASCULAR: Normal S1 and S2, 2/6 murmur, rub, gallop or lift.   LUNGS: Decrease BS at the lower bases   ABDOMEN: Soft, nontender without mass or organomegaly. bowel sounds normoactive.  EXTREMITIES: No clubbing, cyanosis + edema. Distal pulses wnl.   SKIN: warm and dry with normal turgor.  NEURO: Alert/oriented x 0  PSYCH: normal affect.        LABS:                        10.3   8.01  )-----------( 108      ( 2024 08:18 )             32.4     06-05    143  |  104  |  41.6<H>  ----------------------------<  119<H>  5.3   |  20.0<L>  |  1.01    Ca    10.2      2024 15:07  Mg     2.2     -    TPro  6.5<L>  /  Alb  3.4  /  TBili  0.8  /  DBili  x   /  AST  332<H>  /  ALT  145<H>  /  AlkPhos  69  06-    NIC SUMNER      PT/INR - ( 2024 01:41 )   PT: 16.2 sec;   INR: 1.48 ratio           Urinalysis Basic - ( 2024 15:47 )    Color: Dark Yellow / Appearance: Cloudy / S.028 / pH: x  Gluc: x / Ketone: Trace mg/dL  / Bili: Small / Urobili: 1.0 mg/dL   Blood: x / Protein: 100 mg/dL / Nitrite: Negative   Leuk Esterase: Moderate / RBC: 4 /HPF / WBC 76 /HPF   Sq Epi: x / Non Sq Epi: 16 /HPF / Bacteria: Few /HPF        RADIOLOGY & ADDITIONAL STUDIES:    INTERPRETATION OF TELEMETRY (personally reviewed):    ECG: AF without ischemic changes     ECHO: Pending       < from: CT Head No Cont (24 @ 23:51) >  IMPRESSION:    No obvious acute intracranial hemorrhage or mass effect. Ischemic   changes/infarcts as described. If clinically indicated, short-term   follow-up or MRI may be obtained for further evaluation.    Increased opacification of the right mastoid air cell/middle ear cavity.   Recommend clinical correlation to assess acute otomastoiditis.    --- End of Report ---    < end of copied text >      < from: TTE Echo Complete w/Doppler (17 @ 09:57) >     Summary:   1. Left ventricular ejection fraction, by visual estimation, is >75%.   2. Hyperdynamic global left ventricular systolic function.   3. Spectral Doppler shows pseudonormal pattern of left ventricular   myocardial filling (Grade II diastolic dysfunction).   4. There is no evidence of pericardial effusion.  5. Mild mitral annular calcification.   6. Mild Mitral valve prolapse.     MD Marianne Electronically signed on 2017 at 6:47:41 PM       < end of copied text >        < from: CT Chest w/ IV Cont (24 @ 23:51) >  IV;    COMPARISON:  CR XR CHEST IMMEDIATE 2019 12:25 PM    FINDINGS:  Lungs: Small infiltrates in dependent portions of bilateral lower lobes  Pleural spaces: Moderate to large bilateral pleural effusions measuring   4.3 cm  on the right and 3.4 cm on the left and measuring proximally 25% of   thoracic  cavity.  Heart: 1.7 x 1.3 cm thrombus is seen in the appendage of the left atrium   on  series 3, image 51. Mild cardiomegaly.  Atherosclerotic calcifications in   the  vasculature.  Lymphnodes: Unremarkable. No enlarged lymph nodes.  Vasculature: Atherosclerotic soft plaques in descending thoracic aorta. No  dissection. No aneurysmal dilatation.    Bones/joints: Degenerative changes in bilateral shoulder joints. No joint  effusion. Mild old compression fracture at T10 level. No evidence of acute  fracture in and thoracic spine. Mild degenerative changes throughout   thoracic  spine.  Soft tissues: Unremarkable.    IMPRESSION:  1.   1.7 x 1.3 cm thrombus is seen in the appendage of the left atrium on  series 3, image 51.  2.   Moderate to large bilateral pleural effusions measuring 4.3 cm on the  right and 3.4 cm on the left and measuring proximally 25% of thoracic   cavity.  Small bilateral lower lobe dependent infiltrates.    < end of copied text >      Assessment and Plan:  In summary, NIC SUMNER is an 89y Female with past medical history significant for history of dementia stoke infraction with history of ICH  with residual L sided weakness, Chronic AF not on AC due to fall and bleeding risk s/p LARIAT [facility] , Hypothyroidism, DM2, Seizure Disorder presents to [facility] ER for AMS confusion .  History was obtained from chart and son who is at bedside. Patient was found to have recent UTI on 24 placed on abx. Over the past few days has becoming increasingly confused, somnolent, and hallucinating with decreased PO intake. Noted to be hypotensive and hypothermic on arrival and AF RVR. Given Lopressor 5mg IV and Diltiazem 10mg IV x1 with improvement but developed hypotension s/p 2.5L IVF bolus. Lactate 6.8 in ED. CTA Chest/Abd/Pelvis showed 1.   1.7 x 1.3 cm thrombus is seen in the appendage of the left atrium, Moderate to large bilateral pleural effusions, Small bilateral lower lobe dependent infiltrates.  The patient was started on Heparin gtt.  CT surgery called and awaiting possible chest tube placement.     + trop likely due to demand ischemia     Plan  Monitor on TELE; AF currently rate controlled   Heparin gtt for now   Awaiting TTE to re-evaluate LV EF and rule out any significant valvular disease   Abx therapy as per medicine team     Conservative cardiovascular management; spoke with son at length

## 2024-06-06 NOTE — PATIENT PROFILE ADULT - ABILITY TO HEAR (WITH HEARING AID OR HEARING APPLIANCE IF NORMALLY USED):
Physical Therapy Daily Treatment Note    Date: 2019  Patient Name: Jojo Lopez  : 1958   MRN: [id number]  DOInjury: 19  DOSx:    Referring Provider:  Mary Navarro DO     Medical Diagnosis:       Diagnosis Orders   1. Contusion of multiple sites of shoulder, right, initial encounter      2. Pain, arm, right         Outcome Measure: Outcome Measure:   QuickDASH (Disorders of the Arm, Shoulder, and Hand) 41% disability    S: Pt con't to need to use Ibuprofen every morning for pain relief. Pt is also noticing tingling down into R hand. O: Pt given written HEP  Time 1448-7611     Visit 12/ Repeat outcome measure at mid point and end. Pain 7/10     ROM AROM: 130° Forward elevation,  15° ER,  IR to R PSIS  PROM: 135° Forward elevation,  90° ER,  20° IR     Modalities            Manual                  Stretch      Table slides      Wall Flexion      Wall ER stretch      Towel IR stretch 30x HEP TE   IR reaching behind back      Exercise      Shrugs AROM      Pendulum Ex   TE   UBE      Pulleys - flex 4 min  TE   Pulleys-IR 3 min  TE   Supine wand chest press 2 x 20 Black TE   Supine wand flex 2 x 20 \" TE   Supine wand ER/IR 2 x 20 \" TE   Supine flexion 2# 2 x 20  TE   S-lying ABD      S-lying ER 2# 2 x 20  TE   Standing wand flex 2 x 20 Black  TE   Standing wand IR 2 x 20 \" TE   Standing ABD            ROWS: H Blue 2 x 20  TA   ROWS: M Blue 2 x 20  TA   ROWS: L      ER      IR      Shoulder shrugs 4# 2 x 20  TA   Shelf lift 4# 2 x 20  TA   Shoulder press 4# 2 x 20  TA         A:  Tolerated well. Above added to written HEP.   P: Continue with rehab plan  Jazzy Lee PTA    Treatment Charges: Mins Units   Initial Evaluation     Re-Evaluation     Ther Exercise         TE 30 2   Manual Therapy     MT     Ther Activities        TA 30 2   Gait Training          GT     Neuro Re-education NR     Modalities     Non-Billable Service Time     Other     Total Time/Units 60 4 [place] according to son. Pt is a&ox0, cannot assess hearing capability

## 2024-06-06 NOTE — CONSULT NOTE ADULT - SUBJECTIVE AND OBJECTIVE BOX
HPI:  Unable  to obtain secondary to poor mentation  historian  89F wtih PMHX CVA with residual L sided weakness, AFIB, Hypothyroidism, DM2, Chronic Pain, Seizure Disorder presents to [facility] ER for AMS and Lethargy. Pt dx with recent UTI on 24 placed on abx (unclear abx but drfirst showed Nitrofurantoin rx). Over the past few days has becomign increasingly confused, somnolent, and hallucinating. Decreased PO intake. Noted to be hypotensive and hypothermic on arrival. Tachycardic on monitor showing AFIB RVR. Given Lopressor 5mg IV and Diltiazem 10mg IV x1 with improvement but developed hypotension. Given 2.5L IVFB LR in ED however recurrently hypotensive. Lactate 6.8 in ED. CTA Chest/Abd/Pelvis with multiple findings. Found to have MELCHOR and started on Heparin gtt. BL moderate-large pleural effusions. IVF lowered and CT Surgery Consulted for possible thora. BL LL infiltrates suspicious for PNA, Stercoral Colitis, and UA with Acute UTI. Given Ceftriaxone/Zosyn/Vanco for empiric IV ABX. Pt seen/examined. Placed on shahriar hugger. IVF and IV ABX adjusted. Very guarded prognosis. Full Code per ED provider discussed with family.     Unable to obtain ROS 2/2 AMS and Severity of Illness.  (2024 05:11)      PERTINENT PMH REVIEWED: Yes     PAST MEDICAL & SURGICAL HISTORY:  Atrial fibrillation  s/p lariat      CVA (cerebral vascular accident)  left sided weakness      HTN (hypertension)      DM2 (diabetes mellitus, type 2)      Neuropathy      No significant past surgical history      SOCIAL HISTORY:   Unable to obtain secondary to poor mentation  historian                    Substance history:                    Admitted from:  home                     Restorationist/spirituality: Christian                    Cultural concerns:    Baseline ADLs (prior to admission):  Independent/ Dependent                        Surrogate/HCP/Guardian:  Evelin Spence ( daughter)  Krunal Prather (son)     FAMILY HISTORY:  Poor historian        Allergies    No Known Allergies    Intolerances        http://npcrc.org/files/news/palliative_performance_scale_[email or web address].pdf    Present Symptoms:   Dyspnea:  No Yes  Nausea/Vomiting:  No  Yes  Anxiety:   No  Yes  Depression: No Yes Unable  Fatigue: Yes No Unable  Loss of appetite: Yes No   Fair  N/A  NPO  Constipation:  Not reported   Yes    Pain:  No  No signs            Location            Character            Duration            Factors            Severity            Effect    Pain AD Score:  [email or web address] (press ctrl + left click to view)    Review of Systems: Reviewed    All other ROS negative  Unable  Limited  to obtain due to poor mentation historian      MEDICATIONS  (STANDING):  dextrose 10% Bolus 125 milliLiter(s) IV Bolus once  dextrose 5%. 1000 milliLiter(s) (100 mL/Hr) IV Continuous <Continuous>  dextrose 5%. 1000 milliLiter(s) (50 mL/Hr) IV Continuous <Continuous>  dextrose 50% Injectable 25 Gram(s) IV Push once  dextrose 50% Injectable 12.5 Gram(s) IV Push once  glucagon  Injectable 1 milliGRAM(s) IntraMuscular once  heparin  Infusion.  Unit(s)/Hr (11 mL/Hr) IV Continuous <Continuous>  insulin lispro (ADMELOG) corrective regimen sliding scale   SubCutaneous every 6 hours  levETIRAcetam   Injectable 750 milliGRAM(s) IV Push every 12 hours  piperacillin/tazobactam IVPB.. 3.375 Gram(s) IV Intermittent every 8 hours  saline laxative (FLEET) Rectal Enema 1 Enema Rectal once  sodium chloride 0.9%. 1000 milliLiter(s) (75 mL/Hr) IV Continuous <Continuous>  vancomycin  IVPB 1000 milliGRAM(s) IV Intermittent every 12 hours    MEDICATIONS  (PRN):  acetaminophen     Tablet .. 650 milliGRAM(s) Oral every 6 hours PRN Temp greater or equal to 38C (100.4F), Mild Pain (1 - 3)  aluminum hydroxide/magnesium hydroxide/simethicone Suspension 30 milliLiter(s) Oral every 4 hours PRN Dyspepsia  dextrose Oral Gel 15 Gram(s) Oral once PRN Blood Glucose LESS THAN 70 milliGRAM(s)/deciliter  heparin   Injectable 2000 Unit(s) IV Push every 6 hours PRN For aPTT between 40 - 57  heparin   Injectable 4500 Unit(s) IV Push every 6 hours PRN For aPTT less than 40  melatonin 3 milliGRAM(s) Oral at bedtime PRN Insomnia  ondansetron Injectable 4 milliGRAM(s) IV Push every 8 hours PRN Nausea and/or Vomiting      PHYSICAL EXAM:    Vital Signs Last 24 Hrs  T(C): 35.2 (2024 05:12), Max: 36.9 (2024 14:11)  T(F): 95.4 (2024 05:12), Max: 98.4 (2024 14:11)  HR: 97 (2024 08:36) (80 - 133)  BP: 82/52 (2024 08:36) (82/52 - 124/68)  BP(mean): 80 (2024 00:42) (72 - 86)  RR: 24 (2024 08:36) (16 - 24)  SpO2: 91% (2024 08:36) (89% - 100%)    Parameters below as of 2024 08:36  Patient On (Oxygen Delivery Method): nasal cannula  O2 Flow (L/min): 3      Karnofsky     %  General:    HEENT: NCAT      (  )  ET tube   (    ) NGT  Lungs: comfortable  CV:   GI:           (  )  PEG  MSK: normal   weak  chair/bedbound  Neuro:   Skin: warm dry  Psych:    LABS:                        13.2   8.86  )-----------( 170      ( 2024 15:07 )             40.2     06-05    143  |  104  |  41.6<H>  ----------------------------<  119<H>  5.3   |  20.0<L>  |  1.01    Ca    10.2      2024 15:07  Mg     2.2     06-05    TPro  6.5<L>  /  Alb  3.4  /  TBili  0.8  /  DBili  x   /  AST  332<H>  /  ALT  145<H>  /  AlkPhos  69  06-05    PT/INR - ( 2024 01:41 )   PT: 16.2 sec;   INR: 1.48 ratio           Urinalysis Basic - ( 2024 15:47 )    Color: Dark Yellow / Appearance: Cloudy / S.028 / pH: x  Gluc: x / Ketone: Trace mg/dL  / Bili: Small / Urobili: 1.0 mg/dL   Blood: x / Protein: 100 mg/dL / Nitrite: Negative   Leuk Esterase: Moderate / RBC: 4 /HPF / WBC 76 /HPF   Sq Epi: x / Non Sq Epi: 16 /HPF / Bacteria: Few /HPF      I&O's Summary      RADIOLOGY & ADDITIONAL STUDIES:  Imaging reviewed   < from: CT Abdomen and Pelvis w/ IV Cont (24 @ 23:52) >    ******PRELIMINARY REPORT******      ******PRELIMINARY REPORT******         ACC: [id number] EXAM:  CT ABDOMEN AND PELVIS IC   ORDERED BY: BINDU CRUZ     PROCEDURE DATE:  2024    ******PRELIMINARY REPORT******      ******PRELIMINARY REPORT******           INTERPRETATION:  [facility] RADIOLOGIST PRELIMINARY REPORT    PROCEDURE INFORMATION:  Exam: CT Chest With Contrast; Diagnostic  Exam date and time: 2024 11:37 PM  Age: 89 years old  Clinical indication: Sepsis    TECHNIQUE:  Imaging protocol: Diagnostic computed tomography of the chest with   contrast.  3D rendering (Not supervised by radiologist): MIP and/or 3D reconstructed  images were created by the technologist.  Contrast material: IV: OMNIPAQUE 350; Contrast volume: 80 ml; Contrast   route:  IV;    COMPARISON:  CR XR CHEST IMMEDIATE 2019 12:25 PM    FINDINGS:  Lungs: Small infiltrates in dependent portions of bilateral lower lobes  Pleural spaces: Moderate to large bilateral pleural effusions measuring   4.3 cm  onthe right and 3.4 cm on the left and measuring proximally 25% of   thoracic  cavity.  Heart: 1.7 x 1.3 cm thrombus is seen in the appendage of the left atrium   on  series 3, image 51. Mild cardiomegaly.  Atherosclerotic calcifications in   the  vasculature.  Lymph nodes: Unremarkable. No enlarged lymph nodes.  Vasculature: Atherosclerotic soft plaques in descending thoracic aorta. No  dissection. No aneurysmal dilatation.    Bones/joints: Degenerative changes in bilateral shoulder joints. No joint  effusion. Mild old compression fracture at T10 level. No evidence of acute  fracture in and thoracic spine. Mild degenerative changes throughout   thoracic  spine.  Soft tissues: Unremarkable.    IMPRESSION:  1.   1.7 x 1.3 cm thrombus is seen in the appendage of the left atrium on  series 3, image 51.  2.   Moderate to large bilateral pleural effusions measuring 4.3 cm on the  right and 3.4 cm on the left and measuring proximally 25% of thoracic   cavity.  Small bilateral lower lobe dependentinfiltrates.      =========================  PROCEDURE INFORMATION:  Exam: CT Abdomen And Pelvis With Contrast  Exam date and time: 2024 11:37 PM  Age: 89 years old  Clinical indication: Sepsis    TECHNIQUE:  Imaging protocol: Computed tomographyof the abdomen and pelvis with   contrast.  3D rendering (Not supervised by radiologist): MIP and/or 3D reconstructed  images were created by the technologist.  Contrast material: IV: OMNIPAQUE 350; Contrast volume: 80 ml; Contrast   route:  IV;    COMPARISON:  CR XR CHEST IMMEDIATE 2019 12:25 PM    FINDINGS:  Liver: Normal. No mass.  Gallbladder and bile ducts: Stones in the gallbladder. No gallbladder wall  thickening. No pericholecystic fluid.  Pancreas: Normal. No ductal dilation.  Spleen: Normal. No splenomegaly.  Adrenal glands: Normal. No mass.  Kidneys and ureters: Kidneys are unremarkable. No hydronephrosis or  hydroureter. No evidence of nephrolithiasis.  Stomach and bowel: Diffuse wall thickening in the stomach. Colonic  diverticulosis. No findings to suggest acute diverticulitis. Diffuse wall  thickening in the ascending and proximal transverse colon. Large amount of  stool in the rectum. Rectum measures 6.6 x 6.0 cm. Mild haziness and   stranding  of fat is seen posterior to the rectum best seen on sagittal series 6,   image  77.  Appendix: No evidence of appendicitis.    Intraperitoneal space: Unremarkable. No free air. No significant fluid  collection.  Vasculature: Superior mesenteric artery is patent. Moderate   atherosclerotic  disease in the aorta. No significant aneurysmal dilatation.  Lymph nodes: Unremarkable. No enlarged lymph nodes.  Urinary bladder: Unremarkable as visualized.  Reproductive: Unremarkable as visualized.  Bones/joints: Degenerative changes in the spine. No evidence of fracture.   No  osseous erosion to suggest osteomyelitis.  Soft tissues: Unremarkable.    IMPRESSION:  1.   Diffuse wall thickening in the ascending and proximal transverse   colon.  Finding is consistent with colitis.  2.   Large amount of stool in the rectum. Rectum measures 6.6 x 6.0 cm.   Mild  haziness and stranding of fat is seen posterior to the rectum.  Stercoral  colitis can have this imaging finding and should be considered in   differential  diagnosis.  3.   Cholelithiasis without evidence of acute cholecystitis.  4.   Diffuse wall thickening in the stomach. Finding is consistent with  gastritis.        ******PRELIMINARY REPORT******      ******PRELIMINARY REPORT******         FIGUEROA BAKER M.D.;[facility] RADIOLOGIST  This document is a PRELIMINARY interpretation and is pending final   attending approval. 2024 12:57AM    < end of copied text >    < from: CT Head No Cont (24 @ 23:51) >  ACC: [id number] EXAM:  CT BRAIN   ORDERED BY: BINDU IZABELA NANCY     PROCEDURE DATE:  2024          INTERPRETATION:  CLINICAL INDICATION: AMS    TECHNIQUE: CT axial images of the head were obtained without intravenous   contrast. Computer-reconstructed coronal and sagittal images were   obtained.    COMPARISON: 2019.    FINDINGS: There is no obvious acute intracranial hemorrhage, mass effect   or midline shift. Persistent ventricular dilatation greater than the   degree of peripheral may be due to predominantly central atrophy and/or   normal pressure hydrocephalus.  Nonspecific moderate periventricular and subcortical white matter   hypodensities may represent microvascular ischemic changes/infarcts.   Right frontoparietal hypodensity/encephalomalacia is again noted. Left   temporal cortical/subcortical lucency was not seen on the prior study and   may represent an interval age-indeterminate infarcts. Hypodensity at the   right caudate/internal capsule/lentiform nucleus appears more prominent   and may represent evolution of previous infarct. Partially empty sella.    There is no depressed skull fracture. There is sinus mucosal thickening.   Increased opacification of the right mastoid air cell/middle ear cavity.   Visualized tympanomastoid region is unremarkable. Persistent   opacification of the    IMPRESSION:    No obvious acute intracranial hemorrhage or mass effect. Ischemic   changes/infarcts as described. If clinically indicated, short-term   follow-up or MRI may be obtained for further evaluation.    Increased opacification of the right mastoid air cell/middle ear cavity.   Recommend clinical correlation to assess acute otomastoiditis.    --- End of Report ---            LILLIAN NAVARRO MD; Attending Radiologist  This document has been electronically signed. 2024 12:38AM    < end of copied text >          ADVANCE DIRECTIVES/TREATMENT PREFERENCES:  Currently Full code, all aggressive measures desired       89yr man hx of CVA with L sided deficits,  afib , Chronic Pain, Seizure Disorder  DM2 admitted with altered mental status encephalopathy  in the setting of UTI, b/l pleural effusions  bibasilar pneumonia, Colitis    Encephalopathy  Severe Sepsis  UTI  PNA  colitis  IV abx  NPO until mental status improved    Pleural effusions  CTS consulted    LA thrombus  Cardiology consulted    Colitis  Constipation  Fleet enema ordered    Chronic Pain  Gabapentin HPI:  Unable  to obtain secondary to poor mentation  historian  89F wtih PMHX CVA with residual L sided weakness, AFIB, Hypothyroidism, DM2, Chronic Pain, Seizure Disorder presents to [facility] ER for AMS and Lethargy. Pt dx with recent UTI on 24 placed on abx (unclear abx but drfirst showed Nitrofurantoin rx). Over the past few days has becomign increasingly confused, somnolent, and hallucinating. Decreased PO intake. Noted to be hypotensive and hypothermic on arrival. Tachycardic on monitor showing AFIB RVR. Given Lopressor 5mg IV and Diltiazem 10mg IV x1 with improvement but developed hypotension. Given 2.5L IVFB LR in ED however recurrently hypotensive. Lactate 6.8 in ED. CTA Chest/Abd/Pelvis with multiple findings. Found to have MELCHOR and started on Heparin gtt. BL moderate-large pleural effusions. IVF lowered and CT Surgery Consulted for possible thora. BL LL infiltrates suspicious for PNA, Stercoral Colitis, and UA with Acute UTI. Given Ceftriaxone/Zosyn/Vanco for empiric IV ABX. Pt seen/examined. Placed on shahriar hugger. IVF and IV ABX adjusted. Very guarded prognosis. Full Code per ED provider discussed with family.     Unable to obtain ROS 2/2 AMS and Severity of Illness.  (2024 05:11)      PERTINENT PMH REVIEWED: Yes     PAST MEDICAL & SURGICAL HISTORY:  Atrial fibrillation  s/p lariat      CVA (cerebral vascular accident)  left sided weakness      HTN (hypertension)      DM2 (diabetes mellitus, type 2)      Neuropathy      No significant past surgical history      SOCIAL HISTORY:   Unable to obtain secondary to poor mentation  historian                    Substance history:                    Admitted from:  home                     Sikh/spirituality: Sabianist                    Cultural concerns:    Baseline ADLs (prior to admission):   Dependent                        Surrogate/HCP/Guardian:  Daughter Sandy     FAMILY HISTORY:  Poor historian        Allergies    No Known Allergies    Intolerances        http://npcrc.org/files/news/palliative_performance_scale_[email or web address].pdf    Present Symptoms:   Dyspnea:  No   Nausea/Vomiting:  No  Anxiety:   No   Depression:  Unable  Fatigue: Yes  Loss of appetiteN/A  NPO  Constipation:  Not reported     Pain: No signs            Location            Character            Duration            Factors            Severity            Effect    Pain AD Score:  [email or web address] (press ctrl + left click to view)    Review of Systems: Reviewed    Unable  Limited  to obtain due to poor historian      MEDICATIONS  (STANDING):  dextrose 10% Bolus 125 milliLiter(s) IV Bolus once  dextrose 5%. 1000 milliLiter(s) (100 mL/Hr) IV Continuous <Continuous>  dextrose 5%. 1000 milliLiter(s) (50 mL/Hr) IV Continuous <Continuous>  dextrose 50% Injectable 25 Gram(s) IV Push once  dextrose 50% Injectable 12.5 Gram(s) IV Push once  glucagon  Injectable 1 milliGRAM(s) IntraMuscular once  heparin  Infusion.  Unit(s)/Hr (11 mL/Hr) IV Continuous <Continuous>  insulin lispro (ADMELOG) corrective regimen sliding scale   SubCutaneous every 6 hours  levETIRAcetam   Injectable 750 milliGRAM(s) IV Push every 12 hours  piperacillin/tazobactam IVPB.. 3.375 Gram(s) IV Intermittent every 8 hours  saline laxative (FLEET) Rectal Enema 1 Enema Rectal once  sodium chloride 0.9%. 1000 milliLiter(s) (75 mL/Hr) IV Continuous <Continuous>  vancomycin  IVPB 1000 milliGRAM(s) IV Intermittent every 12 hours    MEDICATIONS  (PRN):  acetaminophen     Tablet .. 650 milliGRAM(s) Oral every 6 hours PRN Temp greater or equal to 38C (100.4F), Mild Pain (1 - 3)  aluminum hydroxide/magnesium hydroxide/simethicone Suspension 30 milliLiter(s) Oral every 4 hours PRN Dyspepsia  dextrose Oral Gel 15 Gram(s) Oral once PRN Blood Glucose LESS THAN 70 milliGRAM(s)/deciliter  heparin   Injectable 2000 Unit(s) IV Push every 6 hours PRN For aPTT between 40 - 57  heparin   Injectable 4500 Unit(s) IV Push every 6 hours PRN For aPTT less than 40  melatonin 3 milliGRAM(s) Oral at bedtime PRN Insomnia  ondansetron Injectable 4 milliGRAM(s) IV Push every 8 hours PRN Nausea and/or Vomiting      PHYSICAL EXAM:    Vital Signs Last 24 Hrs  T(C): 35.2 (2024 05:12), Max: 36.9 (2024 14:11)  T(F): 95.4 (2024 05:12), Max: 98.4 (2024 14:11)  HR: 97 (2024 08:36) (80 - 133)  BP: 82/52 (2024 08:36) (82/52 - 124/68)  BP(mean): 80 (2024 00:42) (72 - 86)  RR: 24 (2024 08:36) (16 - 24)  SpO2: 91% (2024 08:36) (89% - 100%)    Parameters below as of 2024 08:36  Patient On (Oxygen Delivery Method): nasal cannula  O2 Flow (L/min): 3    Karnofsky 30 %  General: Frail elderly woman NAD   HEENT: NCAT      CVS RR  Lungs: comfortable  Neuro: sleeping   Skin: warm dry      LABS:                        13.2   8.86  )-----------( 170      ( 2024 15:07 )             40.2     06-05    143  |  104  |  41.6<H>  ----------------------------<  119<H>  5.3   |  20.0<L>  |  1.01    Ca    10.2      2024 15:07  Mg     2.2     06-05    TPro  6.5<L>  /  Alb  3.4  /  TBili  0.8  /  DBili  x   /  AST  332<H>  /  ALT  145<H>  /  AlkPhos  69  06-05    PT/INR - ( 2024 01:41 )   PT: 16.2 sec;   INR: 1.48 ratio           Urinalysis Basic - ( 2024 15:47 )    Color: Dark Yellow / Appearance: Cloudy / S.028 / pH: x  Gluc: x / Ketone: Trace mg/dL  / Bili: Small / Urobili: 1.0 mg/dL   Blood: x / Protein: 100 mg/dL / Nitrite: Negative   Leuk Esterase: Moderate / RBC: 4 /HPF / WBC 76 /HPF   Sq Epi: x / Non Sq Epi: 16 /HPF / Bacteria: Few /HPF      I&O's Summary      RADIOLOGY & ADDITIONAL STUDIES:  Imaging reviewed   < from: CT Abdomen and Pelvis w/ IV Cont (24 @ 23:52) >    ******PRELIMINARY REPORT******      ******PRELIMINARY REPORT******         ACC: [id number] EXAM:  CT ABDOMEN AND PELVIS IC   ORDERED BY: BINDU CRUZ     PROCEDURE DATE:  2024    ******PRELIMINARY REPORT******      ******PRELIMINARY REPORT******           INTERPRETATION:  [facility] RADIOLOGIST PRELIMINARY REPORT    PROCEDURE INFORMATION:  Exam: CT Chest With Contrast; Diagnostic  Exam date and time: 2024 11:37 PM  Age: 89 years old  Clinical indication: Sepsis    TECHNIQUE:  Imaging protocol: Diagnostic computed tomography of the chest with   contrast.  3D rendering (Not supervised by radiologist): MIP and/or 3D reconstructed  images were created by the technologist.  Contrast material: IV: OMNIPAQUE 350; Contrast volume: 80 ml; Contrast   route:  IV;    COMPARISON:  CR XR CHEST IMMEDIATE 2019 12:25 PM    FINDINGS:  Lungs: Small infiltrates in dependent portions of bilateral lower lobes  Pleural spaces: Moderate to large bilateral pleural effusions measuring   4.3 cm  onthe right and 3.4 cm on the left and measuring proximally 25% of   thoracic  cavity.  Heart: 1.7 x 1.3 cm thrombus is seen in the appendage of the left atrium   on  series 3, image 51. Mild cardiomegaly.  Atherosclerotic calcifications in   the  vasculature.  Lymph nodes: Unremarkable. No enlarged lymph nodes.  Vasculature: Atherosclerotic soft plaques in descending thoracic aorta. No  dissection. No aneurysmal dilatation.    Bones/joints: Degenerative changes in bilateral shoulder joints. No joint  effusion. Mild old compression fracture at T10 level. No evidence of acute  fracture in and thoracic spine. Mild degenerative changes throughout   thoracic  spine.  Soft tissues: Unremarkable.    IMPRESSION:  1.   1.7 x 1.3 cm thrombus is seen in the appendage of the left atrium on  series 3, image 51.  2.   Moderate to large bilateral pleural effusions measuring 4.3 cm on the  right and 3.4 cm on the left and measuring proximally 25% of thoracic   cavity.  Small bilateral lower lobe dependentinfiltrates.      =========================  PROCEDURE INFORMATION:  Exam: CT Abdomen And Pelvis With Contrast  Exam date and time: 2024 11:37 PM  Age: 89 years old  Clinical indication: Sepsis    TECHNIQUE:  Imaging protocol: Computed tomographyof the abdomen and pelvis with   contrast.  3D rendering (Not supervised by radiologist): MIP and/or 3D reconstructed  images were created by the technologist.  Contrast material: IV: OMNIPAQUE 350; Contrast volume: 80 ml; Contrast   route:  IV;    COMPARISON:  CR XR CHEST IMMEDIATE 2019 12:25 PM    FINDINGS:  Liver: Normal. No mass.  Gallbladder and bile ducts: Stones in the gallbladder. No gallbladder wall  thickening. No pericholecystic fluid.  Pancreas: Normal. No ductal dilation.  Spleen: Normal. No splenomegaly.  Adrenal glands: Normal. No mass.  Kidneys and ureters: Kidneys are unremarkable. No hydronephrosis or  hydroureter. No evidence of nephrolithiasis.  Stomach and bowel: Diffuse wall thickening in the stomach. Colonic  diverticulosis. No findings to suggest acute diverticulitis. Diffuse wall  thickening in the ascending and proximal transverse colon. Large amount of  stool in the rectum. Rectum measures 6.6 x 6.0 cm. Mild haziness and   stranding  of fat is seen posterior to the rectum best seen on sagittal series 6,   image  77.  Appendix: No evidence of appendicitis.    Intraperitoneal space: Unremarkable. No free air. No significant fluid  collection.  Vasculature: Superior mesenteric artery is patent. Moderate   atherosclerotic  disease in the aorta. No significant aneurysmal dilatation.  Lymph nodes: Unremarkable. No enlarged lymph nodes.  Urinary bladder: Unremarkable as visualized.  Reproductive: Unremarkable as visualized.  Bones/joints: Degenerative changes in the spine. No evidence of fracture.   No  osseous erosion to suggest osteomyelitis.  Soft tissues: Unremarkable.    IMPRESSION:  1.   Diffuse wall thickening in the ascending and proximal transverse   colon.  Finding is consistent with colitis.  2.   Large amount of stool in the rectum. Rectum measures 6.6 x 6.0 cm.   Mild  haziness and stranding of fat is seen posterior to the rectum.  Stercoral  colitis can have this imaging finding and should be considered in   differential  diagnosis.  3.   Cholelithiasis without evidence of acute cholecystitis.  4.   Diffuse wall thickening in the stomach. Finding is consistent with  gastritis.        ******PRELIMINARY REPORT******      ******PRELIMINARY REPORT******         FIGUEROA BAKER M.D.;[facility] RADIOLOGIST  This document is a PRELIMINARY interpretation and is pending final   attending approval. 2024 12:57AM    < end of copied text >    < from: CT Head No Cont (24 @ 23:51) >  ACC: [id number] EXAM:  CT BRAIN   ORDERED BY: BINDU CRUZ     PROCEDURE DATE:  2024          INTERPRETATION:  CLINICAL INDICATION: AMS    TECHNIQUE: CT axial images of the head were obtained without intravenous   contrast. Computer-reconstructed coronal and sagittal images were   obtained.    COMPARISON: 2019.    FINDINGS: There is no obvious acute intracranial hemorrhage, mass effect   or midline shift. Persistent ventricular dilatation greater than the   degree of peripheral may be due to predominantly central atrophy and/or   normal pressure hydrocephalus.  Nonspecific moderate periventricular and subcortical white matter   hypodensities may represent microvascular ischemic changes/infarcts.   Right frontoparietal hypodensity/encephalomalacia is again noted. Left   temporal cortical/subcortical lucency was not seen on the prior study and   may represent an interval age-indeterminate infarcts. Hypodensity at the   right caudate/internal capsule/lentiform nucleus appears more prominent   and may represent evolution of previous infarct. Partially empty sella.    There is no depressed skull fracture. There is sinus mucosal thickening.   Increased opacification of the right mastoid air cell/middle ear cavity.   Visualized tympanomastoid region is unremarkable. Persistent   opacification of the    IMPRESSION:    No obvious acute intracranial hemorrhage or mass effect. Ischemic   changes/infarcts as described. If clinically indicated, short-term   follow-up or MRI may be obtained for further evaluation.    Increased opacification of the right mastoid air cell/middle ear cavity.   Recommend clinical correlation to assess acute otomastoiditis.    --- End of Report ---            LILLIAN NAVARRO MD; Attending Radiologist  This document has been electronically signed. 2024 12:38AM    < end of copied text >          ADVANCE DIRECTIVES/TREATMENT PREFERENCES:  Currently Full code, all aggressive measures desired       89yr man hx of CVA with L sided deficits,  afib , Chronic Pain, Seizure Disorder  DM2 admitted with altered mental status encephalopathy  in the setting of UTI, b/l pleural effusions  bibasilar pneumonia, Colitis    Encephalopathy  Severe Sepsis  UTI  PNA  colitis  IV abx  NPO until mental status improved    Pleural effusions  CTS consulted    LA thrombus  Cardiology consulted    Colitis  Constipation  Fleet enema ordered    Chronic Pain  Gabapentin

## 2024-06-06 NOTE — H&P ADULT - HISTORY OF PRESENT ILLNESS
89F wtih PMHX CVA with residual L sided weakness, AFIB, Hypothyroidism, DM2, Chronic Pain, Seizure Disorder presents to [facility] ER for AMS and Lethargy. Pt dx with recent UTI on 5/25/24 placed on abx (unclear abx but drfirst showed Nitrofurantoin rx). Over the past few days has becomign increasingly confused, somnolent, and hallucinating. Decreased PO intake. Noted to be hypotensive and hypothermic on arrival. Tachycardic on monitor showing AFIB RVR. Given Lopressor 5mg IV and Diltiazem 10mg IV x1 with improvement but developed hypotension. Given 2.5L IVFB LR in ED however recurrently hypotensive. Lactate 6.8 in ED. CTA Chest/Abd/Pelvis with multiple findings. Found to have MELCHOR and started on Heparin gtt. BL moderate-large pleural effusions. IVF lowered and CT Surgery Consulted for possible thora. BL LL infiltrates suspicious for PNA, Stercoral Colitis, and UA with Acute UTI. Given Ceftriaxone/Zosyn/Vanco for empiric IV ABX. Pt seen/examined. Placed on shahriar hugger. IVF and IV ABX adjusted. Very guarded prognosis. Full Code per ED provider discussed with family.     Unable to obtain ROS 2/2 AMS and Severity of Illness.

## 2024-06-06 NOTE — H&P ADULT - ASSESSMENT
ASSESSMENT:  89F wtih PMHX CVA with residual L sided weakness, AFIB, Hypothyroidism, DM2, Chronic Pain, Seizure Disorder presents to [facility] ER for AMS and Lethargy admitted for Severe Sepsis 2/2 Multiple Sources of Infection including Acute UTI, Bibasilar PNA, Stercoral Colitis, AMS 2/2 Toxic Metabolic Encephalopathy, MELCHOR Thrombus, AFIB RVR, NSTEMI, Acute Hypoxic Respiratory Failure, BL Large Pleural Effusions.    PLAN:  Severe Sepsis 2/2 Multiple Sources of Infection including Acute UTI, Bibasilar PNA, Stercoral Colitis  -CT Chest +Bibasilar LL Infiltrates  -CT ABDPEL +Stercoral Colitis  -SIRS +Tachycardia/Hypothermia +AMS and Lactic Acidosis + UTI/Stercoral Colitis/Bibasilar PNA etc  -Admit to SDU  -VS q2  -2.5L IVFB LR in ED  -Cont Gentle IVF Hydration for hypotension and lactic acidosis  -IVF NSS 75cc/hr  -Lactate 6.8 -> 3.6 -> Repeat Lactate until <2  -Vanco/Zosyn/Ceftriaxone given in ED  -Narrow empiric IV ABX to Vanco and Zosyn  -BCX x2 Pending  -UA positive -> UCX pending  -Check MRSA PCR  -Natalie Amos for Hypothermia  -ID Consulted  -MICU Consulted reccs pending    AMS 2/2 Toxic Metabolic Encephalopathy 2/2 Sepsis and Above Infection  -CTH WO negative  -NPO pending improvement in mental status  -Plan as above    MELCHOR Thrombus, AFIB RVR, NSTEMI  -HS-Trop 135 -> Trend Cardiac Enzymes to estrella  -Check TTE  -Heparin gtt for MELCHOR Thrombus  -Diltiazem 10mg IV and Lopressor 5mg IV x1 in ED for RVR  -Limit AVN Blockade with recurrent hypotension  -Cardiology Consulted ([facility])    Acute Hypoxic Respiratory Failure 2/2 Bilateral Large Pleural Effusions  -Limit IVF with BL large effusions  -Lower Hydration to IVF NSS 75cc/hr for hypotension and lactic acidosis  -CT Surgery Consulted appreciate reccs    DM2  -Hold Metformin 500mg q24  -NPO/ISS q6/Accuchecks/A1C    Advanced Care Planning  -Full Code per ER Provider discussed with family  -Very Guarded Prognosis  -Palliative Care Consulted    Medication Reconciliation  -Patient no prior med rec since 2019. Unable to provide HPI/ROS. No family available. Med rec completed based on Dr Thompson please confirm in AM.

## 2024-06-06 NOTE — PROVIDER CONTACT NOTE (OTHER) - ASSESSMENT
2 RN skin check completed on prior shift, please refer to assessment for Pressure Injury/Wound Care locations and stages  Stage 1 PI L Heel  Stage 1 PI Medial Sacrum  Stage 2 PI to L Side Sacrum  Stage 1 PI Medial Coccyx  Suspected DTI to L side of Coccyx  R Elbow Stage 3 PI   MAD to bilateral breasts

## 2024-06-06 NOTE — CONSULT NOTE ADULT - ASSESSMENT
89yr man hx of CVA with L sided deficits,  afib , Chronic Pain, Seizure Disorder  DM2 admitted with altered mental status encephalopathy  in the setting of UTI, b/l pleural effusions  bibasilar pneumonia, Colitis    Encephalopathy  Severe Sepsis  UTI  PNA  colitis  IV abx per ID  NPO until mental status improved    Pleural effusions  CTS consulted-  chest tube?   monitor for dyspnea, O2 sats    LA thrombus  Cardiology consulted    Colitis  Constipation  Fleet enema ordered    Chronic Pain  Gabapentin   89yr man hx of CVA with L sided deficits,  afib , Chronic Pain, Seizure Disorder  DM2 admitted with altered mental status encephalopathy  in the setting of UTI, b/l pleural effusions  bibasilar pneumonia, Colitis    Encephalopathy  Severe Sepsis  UTI  PNA  colitis  IV abx per ID  NPO until mental status improved    Pleural effusions  CTS consulted-  chest tube?   monitor for dyspnea, O2 sats    LA thrombus  Cardiology consulted- TTE  Heparin drip   monitor Hg    Colitis  Constipation  Fleet enema ordered    Chronic Pain  on Gabapentin and Percocet  unable to take po  monitor for pain  avoid opioids 2/2 ms  rec IV Tylenol PRN    Encounter for Palliative Care  Palliative Care consulted to assist with GOC  Met with son and daughter Evelin.   Informed patient's  is , there are 5 children and daughter Sandy is the HCP.   Patient lives with son and has a part time HHA to assist with ADLs.   Patient at baseline is alert, conversant though can be forgetful.    Inquired if mother had any Living Will to guide us about her wishes. Broached advance directives - CPR, mech ventilation feeding tubes.    Evelin appeared saddened with discussion.  Informed her that my inquiry is made so interventions are not done that mother may not have wanted.   She states her sister Sandy is the HCP and is in route from [place] and will be here later.    Plan for further GOC discussions  Psychosocial support

## 2024-06-06 NOTE — CONSULT NOTE ADULT - SUBJECTIVE AND OBJECTIVE BOX
SUBJECTIVE    HPI 89yFemale with h/o AFib, hypothyroidism, NIDDM2, CVA with residual left sided weakness, who presented to [facility] ED c/o AMS, hallucinations, somnolence, decreased po intake.  Pt with recent UTI, started on keflex.  In ED, pt found to be in AF RVR.  given cardizem 10 mg IV x 1, lopressor 5 mg IV x 1 with rate control achieved.  Given rocephin, zosyn, and vanc in ED.  CT chest, abdomen, pelvis with bilateral pleural effusions.  Thoracic Surgery consulted for further evaluation.    History obtained from chart review as pt lethargic, no family at bedside    Pt seen and assessed at bedside.  Pt laying comfortably in hospital bed in NAD on 3L NC.  Unable to obtain HPI 2/2 pt's current clinical condition.      LAST BLOOD THINNER-->   heparin   Injectable   4500 Unit(s) IV Push (06-06-24 @ 02:28)    heparin  Infusion.   1100 Unit(s)/Hr IV Continuous (06-06-24 @ 02:04)        SOCIAL HISTORY   patient lives with ; stairs; assist devices  -- unable to obtain  smoking history - unable to obtain  drinking history - unable to obtain  previous occupation         PAST MEDICAL & SURGICAL HISTORY:  Atrial fibrillation  s/p lariat    CVA (cerebral vascular accident)  left sided weakness    HTN (hypertension)    DM2 (diabetes mellitus, type 2)    Neuropathy    No significant past surgical history        Home Medications:  aspirin 81 mg oral tablet: 1 tab(s) orally once a day (07 Dec 2019 12:31)  digoxin 125 mcg (0.125 mg) oral tablet: 1 tab(s) orally once a day (07 Dec 2019 12:31)  docusate sodium 100 mg oral capsule: 1 cap(s) orally 3 times a day (07 Dec 2019 12:31)  gabapentin 300 mg oral capsule: 1 cap(s) orally 2 times a day, As Needed (07 Dec 2019 12:31)  Keppra: orally 2 times a day (07 Dec 2019 12:31)  Lexapro 10 mg oral tablet: 1 tab(s) orally once a day (07 Dec 2019 12:31)  metFORMIN 1000 mg oral tablet: 0.5 tab(s) orally 2 times a day (07 Dec 2019 12:31)  MiraLax oral powder for reconstitution: orally every other day (at bedtime) (07 Dec 2019 12:31)  Synthroid:  (07 Dec 2019 12:31)  Vitamin B12: orally Monday through Friday (07 Dec 2019 12:31)  Vitamin D3:  (07 Dec 2019 12:31)        Allergies    No Known Allergies    Intolerances        OBJECTIVE DATA    VITALS   currently in sinus rhythm  ICU Vital Signs Last 24 Hrs  T(C): 36.9 (05 Jun 2024 17:20), Max: 36.9 (05 Jun 2024 14:11)  T(F): 98.4 (05 Jun 2024 17:20), Max: 98.4 (05 Jun 2024 14:11)  HR: 91 (06 Jun 2024 01:54) (80 - 133)  BP: 99/63 (06 Jun 2024 01:54) (90/61 - 124/68)  BP(mean): 80 (06 Jun 2024 00:42) (72 - 86)  ABP: --  ABP(mean): --  RR: 18 (06 Jun 2024 01:54) (16 - 24)  SpO2: 100% (06 Jun 2024 01:54) (89% - 100%)    O2 Parameters below as of 06 Jun 2024 01:54  Patient On (Oxygen Delivery Method): nasal cannula  O2 Flow (L/min): 3      LABS                         13.2   8.86  )-----------( 170      ( 05 Jun 2024 15:07 )             40.2   PT/INR - ( 06 Jun 2024 01:41 )   PT: 16.2 sec;   INR: 1.48 ratio         06-05    143  |  104  |  41.6<H>  ----------------------------<  119<H>  5.3   |  20.0<L>  |  1.01    Ca    10.2      05 Jun 2024 15:07  Mg     2.2     06-05    TPro  6.5<L>  /  Alb  3.4  /  TBili  0.8  /  DBili  x   /  AST  332<H>  /  ALT  145<H>  /  AlkPhos  69  06-05        Physical Exam  Neuro: lethargic  HEENT: PERRL, EOMI, oral mucosa pink and moist  Neck: supple, no JVD  CV: irregular rate, irregular rhythm, +S1S2, no murmurs or rub  Pulm/chest: lung sounds CTA, decreased bilateral bases, no accessory muscle use noted, on 3L NC  Abd: soft, NT, ND, +BS  Ext: LAYTON x 4, no C/C, trace BL LE edema  Skin: warm, well perfused, no rashes        IMAGING   personally reviewed imaging       CT Chest with IV Contrast 6/5/24    < from: CT Chest w/ IV Cont (06.05.24 @ 23:51) >    FINDINGS:  Lungs: Small infiltrates in dependent portions of bilateral lower lobes  Pleural spaces: Moderate to large bilateral pleural effusions measuring   4.3 cm  on the right and 3.4 cm on the left and measuring proximally 25% of   thoracic  cavity.  Heart: 1.7 x 1.3 cm thrombus is seen in the appendage of the left atrium   on  series 3, image 51. Mild cardiomegaly.  Atherosclerotic calcifications in   the  vasculature.  Lymphnodes: Unremarkable. No enlarged lymph nodes.  Vasculature: Atherosclerotic soft plaques in descending thoracic aorta. No  dissection. No aneurysmal dilatation.    Bones/joints: Degenerative changes in bilateral shoulder joints. No joint  effusion. Mild old compression fracture at T10 level. No evidence of acute  fracture in and thoracic spine. Mild degenerative changes throughout   thoracic  spine.  Soft tissues: Unremarkable.    IMPRESSION:  1.   1.7 x 1.3 cm thrombus is seen in the appendage of the left atrium on  series 3, image 51.  2.   Moderate to large bilateral pleural effusions measuring 4.3 cm on the  right and 3.4 cm on the left and measuring proximally 25% of thoracic   cavity.  Small bilateral lower lobe dependent infiltrates.    < end of copied text >

## 2024-06-06 NOTE — H&P ADULT - NSHPPHYSICALEXAM_GEN_ALL_CORE
Vital Signs Last 24 Hrs  T(C): 35.8 (06 Jun 2024 03:00), Max: 36.9 (05 Jun 2024 14:11)  T(F): 96.4 (06 Jun 2024 03:00), Max: 98.4 (05 Jun 2024 14:11)  HR: 112 (06 Jun 2024 03:00) (80 - 133)  BP: 115/74 (06 Jun 2024 03:00) (90/61 - 124/68)  BP(mean): 80 (06 Jun 2024 00:42) (72 - 86)  RR: 24 (06 Jun 2024 03:00) (16 - 24)  SpO2: 92% (06 Jun 2024 03:00) (89% - 100%)    Parameters below as of 06 Jun 2024 03:00  Patient On (Oxygen Delivery Method): nasal cannula  O2 Flow (L/min): 3    Constitutional: NAD, VSS  Head: NC/AT   Eyes: PERRL, EOMI, anicteric sclera, conjunctiva WNL  ENT: Normal Pharynx  +Poor oral dentition  Neck: Supple, Non-tender  Chest: Non-tender, no rashes  Cardio: Irregularly Irregular, s1/s2  Resp: +Decreased BS BL throughout  Abd: Soft, Non-tender, Non-distended, no rebound/guarding/rigidity  : not examined  Rectal: not examined  MSK: moving all extremities, uncooperative with exam unable to test strength  Ext: palpable distal pulses,   Psych: AMS  Neuro: AMS +L sided residual deficits, uncooperative with exam  Skin: Cool to touch, +DTI to Coccyx +Stage 3 to R Elbow

## 2024-06-07 LAB
A1C WITH ESTIMATED AVERAGE GLUCOSE RESULT: 5.5 % — SIGNIFICANT CHANGE UP (ref 4–5.6)
APTT BLD: 160.4 SEC — CRITICAL HIGH (ref 24.5–35.6)
APTT BLD: 47.4 SEC — HIGH (ref 24.5–35.6)
APTT BLD: > 200 (ref 24.5–35.6)
ESTIMATED AVERAGE GLUCOSE: 111 MG/DL — SIGNIFICANT CHANGE UP (ref 68–114)
GLUCOSE BLDC GLUCOMTR-MCNC: 138 MG/DL — HIGH (ref 70–99)
GLUCOSE BLDC GLUCOMTR-MCNC: 138 MG/DL — HIGH (ref 70–99)
GLUCOSE BLDC GLUCOMTR-MCNC: 157 MG/DL — HIGH (ref 70–99)
GLUCOSE BLDC GLUCOMTR-MCNC: 162 MG/DL — HIGH (ref 70–99)
HCT VFR BLD CALC: 37.5 % — SIGNIFICANT CHANGE UP (ref 34.5–45)
HGB BLD-MCNC: 12.3 G/DL — SIGNIFICANT CHANGE UP (ref 11.5–15.5)
MCHC RBC-ENTMCNC: 32.8 GM/DL — SIGNIFICANT CHANGE UP (ref 32–36)
MCHC RBC-ENTMCNC: 32.9 PG — SIGNIFICANT CHANGE UP (ref 27–34)
MCV RBC AUTO: 100.3 FL — HIGH (ref 80–100)
MRSA PCR RESULT.: SIGNIFICANT CHANGE UP
NRBC # BLD: 1 /100 WBCS — HIGH (ref 0–0)
PLATELET # BLD AUTO: 133 K/UL — LOW (ref 150–400)
RBC # BLD: 3.74 M/UL — LOW (ref 3.8–5.2)
RBC # FLD: 17.1 % — HIGH (ref 10.3–14.5)
S AUREUS DNA NOSE QL NAA+PROBE: SIGNIFICANT CHANGE UP
WBC # BLD: 8.02 K/UL — SIGNIFICANT CHANGE UP (ref 3.8–10.5)
WBC # FLD AUTO: 8.02 K/UL — SIGNIFICANT CHANGE UP (ref 3.8–10.5)

## 2024-06-07 PROCEDURE — 99232 SBSQ HOSP IP/OBS MODERATE 35: CPT

## 2024-06-07 PROCEDURE — 99497 ADVNCD CARE PLAN 30 MIN: CPT | Mod: 25

## 2024-06-07 PROCEDURE — 99233 SBSQ HOSP IP/OBS HIGH 50: CPT

## 2024-06-07 RX ORDER — FUROSEMIDE 40 MG
40 TABLET ORAL
Refills: 0 | Status: DISCONTINUED | OUTPATIENT
Start: 2024-06-07 | End: 2024-06-08

## 2024-06-07 RX ORDER — SODIUM CHLORIDE 9 MG/ML
1000 INJECTION, SOLUTION INTRAVENOUS
Refills: 0 | Status: DISCONTINUED | OUTPATIENT
Start: 2024-06-07 | End: 2024-06-08

## 2024-06-07 RX ORDER — METOPROLOL TARTRATE 50 MG
2.5 TABLET ORAL EVERY 8 HOURS
Refills: 0 | Status: DISCONTINUED | OUTPATIENT
Start: 2024-06-07 | End: 2024-06-08

## 2024-06-07 RX ORDER — METOPROLOL TARTRATE 50 MG
25 TABLET ORAL
Refills: 0 | Status: DISCONTINUED | OUTPATIENT
Start: 2024-06-07 | End: 2024-06-07

## 2024-06-07 RX ORDER — POTASSIUM CHLORIDE 20 MEQ
10 PACKET (EA) ORAL
Refills: 0 | Status: COMPLETED | OUTPATIENT
Start: 2024-06-07 | End: 2024-06-07

## 2024-06-07 RX ORDER — MORPHINE SULFATE 50 MG/1
2 CAPSULE, EXTENDED RELEASE ORAL
Refills: 0 | Status: DISCONTINUED | OUTPATIENT
Start: 2024-06-07 | End: 2024-06-08

## 2024-06-07 RX ADMIN — HEPARIN SODIUM 2000 UNIT(S): 5000 INJECTION INTRAVENOUS; SUBCUTANEOUS at 20:26

## 2024-06-07 RX ADMIN — Medication 100 MILLIEQUIVALENT(S): at 18:28

## 2024-06-07 RX ADMIN — HEPARIN SODIUM 400 UNIT(S)/HR: 5000 INJECTION INTRAVENOUS; SUBCUTANEOUS at 20:20

## 2024-06-07 RX ADMIN — PIPERACILLIN AND TAZOBACTAM 25 GRAM(S): 4; .5 INJECTION, POWDER, LYOPHILIZED, FOR SOLUTION INTRAVENOUS at 06:58

## 2024-06-07 RX ADMIN — NYSTATIN CREAM 1 APPLICATION(S): 100000 CREAM TOPICAL at 05:48

## 2024-06-07 RX ADMIN — MORPHINE SULFATE 2 MILLIGRAM(S): 50 CAPSULE, EXTENDED RELEASE ORAL at 13:06

## 2024-06-07 RX ADMIN — MORPHINE SULFATE 2 MILLIGRAM(S): 50 CAPSULE, EXTENDED RELEASE ORAL at 12:04

## 2024-06-07 RX ADMIN — HEPARIN SODIUM 500 UNIT(S)/HR: 5000 INJECTION INTRAVENOUS; SUBCUTANEOUS at 07:21

## 2024-06-07 RX ADMIN — Medication 1: at 05:35

## 2024-06-07 RX ADMIN — PIPERACILLIN AND TAZOBACTAM 25 GRAM(S): 4; .5 INJECTION, POWDER, LYOPHILIZED, FOR SOLUTION INTRAVENOUS at 13:16

## 2024-06-07 RX ADMIN — NYSTATIN CREAM 1 APPLICATION(S): 100000 CREAM TOPICAL at 21:05

## 2024-06-07 RX ADMIN — Medication 100 MILLIEQUIVALENT(S): at 20:29

## 2024-06-07 RX ADMIN — HEPARIN SODIUM 0 UNIT(S)/HR: 5000 INJECTION INTRAVENOUS; SUBCUTANEOUS at 11:19

## 2024-06-07 RX ADMIN — SODIUM CHLORIDE 70 MILLILITER(S): 9 INJECTION, SOLUTION INTRAVENOUS at 13:14

## 2024-06-07 RX ADMIN — HEPARIN SODIUM 500 UNIT(S)/HR: 5000 INJECTION INTRAVENOUS; SUBCUTANEOUS at 03:27

## 2024-06-07 RX ADMIN — LEVETIRACETAM 750 MILLIGRAM(S): 250 TABLET, FILM COATED ORAL at 18:18

## 2024-06-07 RX ADMIN — PIPERACILLIN AND TAZOBACTAM 25 GRAM(S): 4; .5 INJECTION, POWDER, LYOPHILIZED, FOR SOLUTION INTRAVENOUS at 21:05

## 2024-06-07 RX ADMIN — Medication 250 MILLIGRAM(S): at 05:39

## 2024-06-07 RX ADMIN — HEPARIN SODIUM 300 UNIT(S)/HR: 5000 INJECTION INTRAVENOUS; SUBCUTANEOUS at 12:30

## 2024-06-07 RX ADMIN — Medication 40 MILLIGRAM(S): at 13:22

## 2024-06-07 RX ADMIN — LEVETIRACETAM 750 MILLIGRAM(S): 250 TABLET, FILM COATED ORAL at 05:37

## 2024-06-07 RX ADMIN — HEPARIN SODIUM 0 UNIT(S)/HR: 5000 INJECTION INTRAVENOUS; SUBCUTANEOUS at 02:20

## 2024-06-07 RX ADMIN — Medication 1: at 13:22

## 2024-06-07 RX ADMIN — HEPARIN SODIUM 300 UNIT(S)/HR: 5000 INJECTION INTRAVENOUS; SUBCUTANEOUS at 19:13

## 2024-06-07 NOTE — PROGRESS NOTE ADULT - SUBJECTIVE AND OBJECTIVE BOX
[facility]                                   [address] [place] [address]                                                      PHONE: [phone]                                                         FAX: [phone]  [email or web address]  ---------------------------------------------------------------------------------------------------------------------------------    Overnight events/patient complaints:      NAD none verbal     No Known Allergies    MEDICATIONS  (STANDING):  dextrose 10% Bolus 125 milliLiter(s) IV Bolus once  dextrose 5% + sodium chloride 0.9%. 1000 milliLiter(s) (70 mL/Hr) IV Continuous <Continuous>  dextrose 5%. 1000 milliLiter(s) (100 mL/Hr) IV Continuous <Continuous>  dextrose 5%. 1000 milliLiter(s) (50 mL/Hr) IV Continuous <Continuous>  dextrose 50% Injectable 25 Gram(s) IV Push once  dextrose 50% Injectable 12.5 Gram(s) IV Push once  furosemide   Injectable 40 milliGRAM(s) IV Push two times a day  glucagon  Injectable 1 milliGRAM(s) IntraMuscular once  heparin  Infusion.  Unit(s)/Hr (11 mL/Hr) IV Continuous <Continuous>  insulin lispro (ADMELOG) corrective regimen sliding scale   SubCutaneous every 6 hours  levETIRAcetam   Injectable 750 milliGRAM(s) IV Push every 12 hours  metoprolol tartrate 25 milliGRAM(s) Oral two times a day  nystatin Powder 1 Application(s) Topical two times a day  piperacillin/tazobactam IVPB.. 3.375 Gram(s) IV Intermittent every 8 hours  vancomycin  IVPB 1000 milliGRAM(s) IV Intermittent every 12 hours    MEDICATIONS  (PRN):  acetaminophen     Tablet .. 650 milliGRAM(s) Oral every 6 hours PRN Temp greater or equal to 38C (100.4F), Mild Pain (1 - 3)  aluminum hydroxide/magnesium hydroxide/simethicone Suspension 30 milliLiter(s) Oral every 4 hours PRN Dyspepsia  dextrose Oral Gel 15 Gram(s) Oral once PRN Blood Glucose LESS THAN 70 milliGRAM(s)/deciliter  heparin   Injectable 2000 Unit(s) IV Push every 6 hours PRN For aPTT between 40 - 57  heparin   Injectable 4500 Unit(s) IV Push every 6 hours PRN For aPTT less than 40  melatonin 3 milliGRAM(s) Oral at bedtime PRN Insomnia  ondansetron Injectable 4 milliGRAM(s) IV Push every 8 hours PRN Nausea and/or Vomiting      Vital Signs Last 24 Hrs  T(C): 36.5 (07 Jun 2024 08:00), Max: 36.8 (06 Jun 2024 15:04)  T(F): 97.7 (07 Jun 2024 08:00), Max: 98.3 (06 Jun 2024 15:04)  HR: 126 (07 Jun 2024 07:57) (94 - 132)  BP: 133/66 (07 Jun 2024 07:57) (99/67 - 133/66)  BP(mean): --  RR: 20 (07 Jun 2024 07:57) (18 - 20)  SpO2: 93% (07 Jun 2024 07:57) (93% - 100%)    Parameters below as of 07 Jun 2024 07:57  Patient On (Oxygen Delivery Method): nasal cannula  O2 Flow (L/min): 3    ICU Vital Signs Last 24 Hrs  NIC TAISHA  I&O's Detail    I&O's Summary    Drug Dosing Weight  NIC SUMNER      PHYSICAL EXAM:  General: Appears well developed, alert and cooperative.  HEENT: Head; normocephalic, atraumatic.  Eyes: Pupils reactive, cornea wnl.  Neck: Supple, no nodes adenopathy, no NVD or carotid bruit or thyromegaly.  CARDIOVASCULAR: Normal S1 and S2, 1/6 murmur, rub, gallop or lift.   LUNGS: Decrease BS at the lower bases   ABDOMEN: Soft, nontender without mass or organomegaly. bowel sounds normoactive.  EXTREMITIES: No clubbing, cyanosis + edema. Distal pulses wnl.   SKIN: warm and dry with normal turgor.  NEURO: Alert/oriented x0         LABS:                        12.3   8.02  )-----------( 133      ( 07 Jun 2024 01:40 )             37.5     06-06    131<L>  |  94<L>  |  34.1<H>  ----------------------------<  433<H>  3.5   |  18.0<L>  |  0.70    Ca    7.9<L>      06 Jun 2024 08:18  Phos  2.2     06-06  Mg     1.9     06-06    TPro  4.5<L>  /  Alb  2.3<L>  /  TBili  0.4  /  DBili  x   /  AST  165<H>  /  ALT  100<H>  /  AlkPhos  51  06-06    NIC KEISHABIBIANASHEREE      PT/INR - ( 06 Jun 2024 01:41 )   PT: 16.2 sec;   INR: 1.48 ratio         PTT - ( 07 Jun 2024 01:40 )  PTT:> 200  Urinalysis Basic - ( 06 Jun 2024 08:18 )    Color: x / Appearance: x / SG: x / pH: x  Gluc: 433 mg/dL / Ketone: x  / Bili: x / Urobili: x   Blood: x / Protein: x / Nitrite: x   Leuk Esterase: x / RBC: x / WBC x   Sq Epi: x / Non Sq Epi: x / Bacteria: x        RADIOLOGY & ADDITIONAL STUDIES:    INTERPRETATION OF TELEMETRY (personally reviewed):        < from: CT Chest w/ IV Cont (06.05.24 @ 23:51) >  IV;    COMPARISON:  CR XR CHEST IMMEDIATE 12/7/2019 12:25 PM    FINDINGS:  Lungs: Small infiltrates in dependent portions of bilateral lower lobes  Pleural spaces: Moderate to large bilateral pleural effusions measuring   4.3 cm  on the right and 3.4 cm on the left and measuring proximally 25% of   thoracic  cavity.  Heart: 1.7 x 1.3 cm thrombus is seen in the appendage of the left atrium   on  series 3, image 51. Mild cardiomegaly.  Atherosclerotic calcifications in   the  vasculature.  Lymphnodes: Unremarkable. No enlarged lymph nodes.  Vasculature: Atherosclerotic soft plaques in descending thoracic aorta. No  dissection. No aneurysmal dilatation.    Bones/joints: Degenerative changes in bilateral shoulder joints. No joint  effusion. Mild old compression fracture at T10 level. No evidence of acute  fracture in and thoracic spine. Mild degenerative changes throughout   thoracic  spine.  Soft tissues: Unremarkable.    IMPRESSION:  1.   1.7 x 1.3 cm thrombus is seen in the appendage of the left atrium on  series 3, image 51.  2.   Moderate to large bilateral pleural effusions measuring 4.3 cm on the  right and 3.4 cm on the left and measuring proximally 25% of thoracic   cavity.  Small bilateral lower lobe dependent infiltrates.    < end of copied text >      < from: TTE W or WO Ultrasound Enhancing Agent (06.06.24 @ 16:19) >  _______________________________________________________________________________________     CONCLUSIONS:      1. Left ventricular cavity is normal in size. Left ventricular systolic function is severely decreased with an ejection fraction of 24 % by Hillman's method of disks with an ejection fraction visually estimated at <20 %.   2. The left ventricular diastolic function is indeterminate. Analysis of left ventricular diastolic function and filling pressure is made challenging by the presence of atrial fibrillation.   3. Normal right ventricular cavity size and moderately to severely reduced systolic function.   4. The left atrium is normal in size.   5. Moderate to severe tricuspid regurgitation.   6. Estimated pulmonary artery systolic pressure is 49 mmHg, consistent with moderate pulmonary hypertension.   7. Moderate mitral regurgitation.   8. Trileaflet aortic valve, with mild aortic regurgitation.   9. Moderate bilateral pleural effusion noted.  10. No pericardial effusion seen.  11. No prior echocardiogram is available for comparison.    < end of copied text >        Assessment and Plan:  In summary, NIC SUMNER is an 89y Female with past medical history significant for history of dementia stoke infraction with history of ICH 2014 with residual L sided weakness, Chronic AF not on AC due to fall and bleeding risk s/p LARIAT [facility] 2015, Hypothyroidism, DM2, Seizure Disorder presents to [facility] ER for AMS confusion .  History was obtained from chart and son who is at bedside. Patient was found to have recent UTI on 5/25/24 placed on abx. Over the past few days has becoming increasingly confused, somnolent, and hallucinating with decreased PO intake. Noted to be hypotensive and hypothermic on arrival and AF RVR. Given Lopressor 5mg IV and Diltiazem 10mg IV x1 with improvement but developed hypotension s/p 2.5L IVF bolus. Lactate 6.8 in ED. CTA Chest/Abd/Pelvis showed 1.   1.7 x 1.3 cm thrombus is seen in the appendage of the left atrium, Moderate to large bilateral pleural effusions, Small bilateral lower lobe dependent infiltrates.  The patient was started on Heparin gtt.  CT surgery called and awaiting possible chest tube placement.     + trop likely due to demand ischemia     Acute systolic heart failrue HFrEF in settingof AF RVR ? tachyarrhythmic induce     Plan  Heparin gtt for now   Lasix 40 mg IV BID   B-Blockers   Abx therapy as per medicine team     Conservative cardiovascular management; spoke with son at length     DNR/DNI and consider Hospice

## 2024-06-07 NOTE — CHART NOTE - NSCHARTNOTESELECT_GEN_ALL_CORE
Event Note
Event Note
Thoracic surgery/Off Service Note
thoracic surgery/Event Note
thoracic surgery/Event Note

## 2024-06-07 NOTE — PROGRESS NOTE ADULT - SUBJECTIVE AND OBJECTIVE BOX
[facility]                                                INFECTIOUS DISEASES  =======================================================                   Nilton Femi#   Guillermo Mchugh MD#    Pk Cabrales MD*                           Bryanna Barbour MD*   Gaby Delarosa MD*           Diplomates American Board of Internal Medicine & Infectious Diseases                  # [place] Office - Appt - Tel  [phone] Fax [phone]                * [place] Office - Appt - Tel [phone] Fax [phone]                                  Hospital Consult line:  [phone]  =======================================================      [facility]-[id number]  NIC SUMNER   follow up for: hypothermia   remains on warming blanket  tachy overnight  poorly responsive  patient seen and examined.       I have personally reviewed the labs and data; pertinent labs and data are listed in this note; please see below.   ===================================================  REVIEW OF SYSTEMS:  cannot obtain    =======================================================  Allergies    No Known Allergies    Intolerances    Antibiotics:  piperacillin/tazobactam IVPB.. 3.375 Gram(s) IV Intermittent every 8 hours    Other medications:  dextrose 10% Bolus 125 milliLiter(s) IV Bolus once  dextrose 5% + sodium chloride 0.9%. 1000 milliLiter(s) IV Continuous <Continuous>  dextrose 5%. 1000 milliLiter(s) IV Continuous <Continuous>  dextrose 5%. 1000 milliLiter(s) IV Continuous <Continuous>  dextrose 50% Injectable 25 Gram(s) IV Push once  dextrose 50% Injectable 12.5 Gram(s) IV Push once  furosemide   Injectable 40 milliGRAM(s) IV Push two times a day  glucagon  Injectable 1 milliGRAM(s) IntraMuscular once  heparin  Infusion.  Unit(s)/Hr IV Continuous <Continuous>  insulin lispro (ADMELOG) corrective regimen sliding scale   SubCutaneous every 6 hours  levETIRAcetam   Injectable 750 milliGRAM(s) IV Push every 12 hours  metoprolol tartrate 25 milliGRAM(s) Oral two times a day  nystatin Powder 1 Application(s) Topical two times a day    ======================================================  Physical Exam:  ============  T(F): 97 (07 Jun 2024 10:15), Max: 98.3 (06 Jun 2024 15:04)  HR: 129 (07 Jun 2024 11:33)  BP: 108/67 (07 Jun 2024 11:33)  RR: 20 (07 Jun 2024 11:33)  SpO2: 99% (07 Jun 2024 11:33) (93% - 100%)  temp max in last 48H T(F): , Max: 98.4 (06-05-24 @ 14:11)    General:  elderly female, ill appearing, dyspneic on o2, lethargic  Eye: no conjunctival pallor, no scleral icterus  Neck: Supple, No lymphadenopathy.  Respiratory: decreased BS to auscultation, Respirations are non-labored.  Cardiovascular: Normal rate, Regular rhythm,  s1+s2  Gastrointestinal: Soft, Non-tender, Non-distended, Normal bowel sounds.  Integumentary: No rash.  Neurologic: lethargic  =======================================================  Labs:                        12.3   8.02  )-----------( 133      ( 07 Jun 2024 01:40 )             37.5     06-06    131<L>  |  94<L>  |  34.1<H>  ----------------------------<  433<H>  3.5   |  18.0<L>  |  0.70    Ca    7.9<L>      06 Jun 2024 08:18  Phos  2.2     06-06  Mg     1.9     06-06    TPro  4.5<L>  /  Alb  2.3<L>  /  TBili  0.4  /  DBili  x   /  AST  165<H>  /  ALT  100<H>  /  AlkPhos  51  06-06      Culture - Urine (collected 06-05-24 @ 15:47)  Source: Catheterized Catheterized  Final Report (06-06-24 @ 18:26):    <10,000 CFU/mL Normal Urogenital Briana    Culture - Blood (collected 06-05-24 @ 15:07)  Source: .Blood Blood-Peripheral  Preliminary Report (06-06-24 @ 23:02):    No growth at 24 hours    Culture - Blood (collected 06-05-24 @ 15:00)  Source: .Blood Blood-Peripheral  Preliminary Report (06-06-24 @ 23:02):    No growth at 24 hours    < from: CT Abdomen and Pelvis w/ IV Cont (06.05.24 @ 23:52) >  FINDINGS:    CHEST:    LUNGS AND LARGE AIRWAYS: PLEURA:    There is mild bilateral interlobular septal thickening, which could   reflect interstitial edema.    There are moderate-sized bilateral pleural effusions with compressive   atelectasis lower lobes.  The central airways are patent.    VESSELS: Atherosclerotic changes thoracic aorta and coronary artery   calcification.    HEART:  Left atrial enlargement. 1.5 cm filling defect within the left atrial   appendage compatible with thrombus.   No pericardial effusion.    MEDIASTINUM AND DARIN:  Small, subcentimeter short axis mediastinal lymph nodes.    There is a mildly patulous esophagus.    CHEST WALL AND LOWER NECK: Within normal limits.    ABDOMEN AND PELVIS:    LIVER: Within normal limits.  BILE DUCTS: Normal caliber.  GALLBLADDER: Dependent biliary sludge and/or cholelithiasis.  SPLEEN: Within normal limits.  PANCREAS: Atrophic.  ADRENALS: Bilateral adrenal thickening.  KIDNEYS/URETERS:  Bilateral renal hypodensities are too small for characterization.  No hydronephrosis.  Mild right perinephric stranding.    BLADDER: Within normal limits.  REPRODUCTIVE ORGANS:  The uterus and adnexa appear within normal limits.    BOWEL:  Colonic wall thickening ascending colon through the hepatic flexure.  Bowel obstruction.  Distended, fecal impacted rectum with mild rectal wall thickening and   perirectal/presacral stranding.  Sigmoid diverticulosis, without CT evidence of diverticulitis.   Appendix is normal.  PERITONEUM/RETROPERITONEUM:  Trace ascites.    VESSELS: Atherosclerotic changes.  Infrarenal abdominal aortic ectasia measuring 2.4 cm.    LYMPH NODES: No lymphadenopathy.    ABDOMINAL WALL: Within normal limits.    BONES: Degenerative changes.    < end of copied text >    < from: CT Abdomen and Pelvis w/ IV Cont (06.05.24 @ 23:52) >  IMPRESSION:    Moderate-sized bilateral pleural effusions with compressive atelectasis.    Septal thickening suggestive of interstitial edema.    Thrombus left atrial appendage.  This finding was communicated by the [facility] radiologist Dr.Amir Lira at   1:36 AM on 6/6/2024.    Right-sided colitis.  Findings suggestive of a stercoral proctocolitis.  Recommend further clinical correlation.    Other findings as discussed above.    This study was preliminary reported by [facility].    --- End of Report ---      < end of copied text >    < from: TTE W or WO Ultrasound Enhancing Agent (06.06.24 @ 16:19) >  CONCLUSIONS:      1. Left ventricular cavity is normal in size. Left ventricular systolic function is severely decreased with an ejection fraction of 24 % by Hillman's method of disks with an ejection fraction visually estimated at <20 %.   2. The left ventricular diastolic function is indeterminate. Analysis of left ventricular diastolic function and filling pressure is made challenging by the presence of atrial fibrillation.   3. Normal right ventricular cavity size and moderately to severely reduced systolic function.   4. The left atrium is normal in size.   5. Moderate to severe tricuspid regurgitation.   6. Estimated pulmonary artery systolic pressure is 49 mmHg, consistent with moderate pulmonary hypertension.   7. Moderate mitral regurgitation.   8. Trileaflet aortic valve, with mild aortic regurgitation.   9. Moderate bilateral pleural effusion noted.  10. No pericardial effusion seen.  11. No prior echocardiogram is available for comparison.    < end of copied text >

## 2024-06-07 NOTE — PROGRESS NOTE ADULT - ASSESSMENT
89F wtih PMHX CVA with residual L sided weakness, AFIB, Hypothyroidism, DM2, Chronic Pain, Seizure Disorder presents to [facility] ER for AMS and Lethargy. Pt dx with recent UTI on 5/25/24 placed on abx (unclear abx but drfirst showed Nitrofurantoin rx). Over the past few days has becomign increasingly confused, somnolent, and hallucinating. Decreased PO intake. Noted to be hypotensive and hypothermic on arrival. Tachycardic on monitor showing AFIB RVR. Given Lopressor 5mg IV and Diltiazem 10mg IV x1 with improvement but developed hypotension. Given 2.5L IVFB LR in ED however recurrently hypotensive. Lactate 6.8 in ED. CTA Chest/Abd/Pelvis with multiple findings. Found to have MELCHOR and started on Heparin gtt. BL moderate-large pleural effusions. IVF lowered and CT Surgery Consulted for possible thora. BL LL infiltrates suspicious for PNA, Stercoral Colitis, and UA with Acute UTI. Given Ceftriaxone/Zosyn/Vanco for empiric IV ABX. Pt seen/examined. Placed on shahriar hugger. IVF and IV ABX adjusted. Very guarded prognosis. Full Code per ED provider discussed with family.       2F wtih PMHX CVA with residual L sided weakness, AFIB, Hypothyroidism, DM2, Chronic Pain, Seizure Disorder presents to [facility] ER for AMS and Lethargy admitted for Severe Sepsis 2/2 Multiple Sources of Infection including Acute UTI, Bibasilar PNA, Stercoral Colitis, AMS 2/2 Toxic Metabolic Encephalopathy, MELCHOR Thrombus, AFIB RVR, NSTEMI, Acute Hypoxic Respiratory Failure, BL Large Pleural Effusions.    PLAN:  Severe Sepsis 2/2 Multiple Sources of Infection including Acute UTI, Bibasilar PNA, Stercoral Colitis  -CT Chest +Bibasilar LL Infiltrates  -CT ABDPEL +Stercoral Colitis  -SIRS +Tachycardia/Hypothermia +AMS and Lactic Acidosis + UTI/Stercoral Colitis/Bibasilar PNA etc  -Admit to SDU  -VS q2  -2.5L IVFB LR in ED  -Cont Gentle IVF Hydration for hypotension and lactic acidosis  -IVF NSS 75cc/hr  -Lactate 6.8 -> 3.6 -> Repeat Lactate until <2  -Vanco/Zosyn/Ceftriaxone given in ED  -Narrow empiric IV ABX to Vanco and Zosyn  -BCX x2 Pending  -UA positive -> UCX pending  -Check MRSA PCR  -Shahriar Amos for Hypothermia  -ID Consulted  -MICU Consulted reccs pending    AMS 2/2 Toxic Metabolic Encephalopathy 2/2 Sepsis and Above Infection  -CTH WO negative  -NPO pending improvement in mental status  -Plan as above    MELCHOR Thrombus, AFIB RVR, NSTEMI  -HS-Trop 135 -> Trend Cardiac Enzymes to estrella  -Check TTE  -Heparin gtt for MELCHOR Thrombus  -Diltiazem 10mg IV and Lopressor 5mg IV x1 in ED for RVR  -Limit AVN Blockade with recurrent hypotension  -Cardiology Consulted ([facility])    Acute Hypoxic Respiratory Failure 2/2 Bilateral Large Pleural Effusions  -Limit IVF with BL large effusions  -Lower Hydration to IVF NSS 75cc/hr for hypotension and lactic acidosis  -CT Surgery Consulted appreciate reccs    DM2  -Hold Metformin 500mg q24  -NPO/ISS q6/Accuchecks/A1C    Advanced Care Planning  -Full Code per ER Provider discussed with family  -Very Guarded Prognosis  -Palliative Care Consulted       89F wtih PMHX CVA with residual L sided weakness, AFIB, Hypothyroidism, DM2, Chronic Pain, Seizure Disorder presents to [facility] ER for AMS and Lethargy. Pt dx with recent UTI on 5/25/24 placed on abx (unclear abx but drfirst showed Nitrofurantoin rx). Over the past few days has becomign increasingly confused, somnolent, and hallucinating. Decreased PO intake. Noted to be hypotensive and hypothermic on arrival. Tachycardic on monitor showing AFIB RVR. Given Lopressor 5mg IV and Diltiazem 10mg IV x1 with improvement but developed hypotension. Given 2.5L IVFB LR in ED however recurrently hypotensive. Lactate 6.8 in ED. CTA Chest/Abd/Pelvis with multiple findings. Found to have MELCHOR and started on Heparin gtt. BL moderate-large pleural effusions. IVF lowered and CT Surgery Consulted for possible thora. BL LL infiltrates suspicious for PNA, Stercoral Colitis, and UA with Acute UTI. Given Ceftriaxone/Zosyn/Vanco for empiric IV ABX. Pt seen/examined. Placed on shahriar hugger. IVF and IV ABX adjusted. Very guarded prognosis. Full Code per ED provider discussed with family.     1- severe CM with b/l pleural effusion   EF < 20 %   cardiology follow up appreciated   lasix iv 40 mg bid   family is reluctant for thoracentesis CT   d/w Evelin and Krunal children at the bedside around 2:30 pm   aware of TTE result overall prognosis       2-Severe Sepsis 2/2 Multiple Sources of Infection including Acute UTI, Bibasilar PNA, Stercoral Colitis  Ct of C abd reviewed   cont empirical iv ab , ID follow up appreciated   no improvement in mental status   unresponsive   Shahriar Hugger for Hypothermia    3- Atrial FIB / MELCHOR thrombus ?   on iv heparin   monitor PTT   metoprolol iv tid for rate   NPO     4-AMS 2/2 Toxic Metabolic Encephalopathy due to infection   -CTH WO negative  -no improvement in mental status   NPO     5-DM2  -NPO/ISS q6/Accuchecks/A1C    Advanced Care Planning  had long conversation again todaty with pt's daughter and son re her condition , TTE , plan of care and poor progmosis   Famliy does not want the pt suffer and be in pain   they are aware of chance of recovery and overall prognsosi given cardiac status and other medical problems ; likely transition to comfort   spoke about hospice , requested referal consult placed   d/w SW and CM     added iv morphine prn for pain or Dypsnea   cont current medical treatment    DNR/ DNI , no feeding tube

## 2024-06-07 NOTE — PROGRESS NOTE ADULT - ASSESSMENT
89yr man hx of CVA with L sided deficits,  afib , Chronic Pain, Seizure Disorder  DM2 admitted with altered mental status encephalopathy  in the setting of UTI, b/l pleural effusions  bibasilar pneumonia, Colitis    Encephalopathy  Severe Sepsis  UTI  PNA  colitis  IV abx per ID  NPO until mental status improved    Pleural effusions  CTS consulted-  family does not want chest tube  monitor for dyspnea, O2 sats    LA thrombus  Cardiology consulted- TTE  Heparin drip   monitor Hg    Colitis  Constipation  Fleet enema ordered    Chronic Pain  on Gabapentin and Percocet  unable to take po  monitor for pain  Morphine 2mg IVP PRN    Seizure disorder  cont Keppra IV    Encounter for Palliative Care  See [facility] note above for details.  In summary  1. Family does not want Chest tube  2.  They are agreeable to hospice inpatient   3 Discussed transition to comfort care plan while awaiting approval and transfer.  Daughter Evelin wishes to await her sister Sandy to arrive before making any decisions    Psychosocial support    If family agrees to comfort care. Recommend -  d/c non essential meds  Maintain Keppra  IV   Recommend ATC Morphine 2mg IVP q6 - Patient has hx of Pain and is on Gabapentin as outpatient. Currently unable to receive 2/2 unable to take po 2/2 poor mental status           89yr man hx of CVA with L sided deficits,  afib , Chronic Pain, Seizure Disorder  DM2 admitted with altered mental status encephalopathy  in the setting of UTI, b/l pleural effusions  bibasilar pneumonia, Colitis    Encephalopathy  Severe Sepsis  UTI  PNA  colitis  IV abx per ID  NPO until mental status improved    Pleural effusions  CTS consulted-  family does not want chest tube  monitor for dyspnea, O2 sats    LA thrombus  Cardiology consulted- TTE  Heparin drip   monitor Hg    Colitis  Constipation  Fleet enema ordered    Chronic Pain  on Gabapentin and Percocet  unable to take po  monitor for pain  Morphine 2mg IVP PRN    Seizure disorder  cont Keppra IV    Encounter for Palliative Care  See [facility] note above for details.  In summary  1. Family does not want Chest tube  2.  They are agreeable to hospice inpatient   3 Discussed transition to comfort care plan while awaiting approval and transfer.  Daughter Evelin wishes to await her sister Sandy to arrive before making any decisions    Psychosocial support    If family agrees to comfort care. Recommend -  d/c non essential meds  Maintain Keppra  IV   Recommend ATC Morphine 2mg IVP q8nr ATC  - Patient has hx of Pain and is on Gabapentin as outpatient. Currently unable to receive 2/2 unable to take po 2/2 poor mental status    Doc to doc completed for [facility] should the family agree

## 2024-06-07 NOTE — PROGRESS NOTE ADULT - ASSESSMENT
89F wtih PMHX CVA with residual L sided weakness, AFIB, Hypothyroidism, DM2, Chronic Pain, Seizure Disorder presents to [facility] ER for AMS and Lethargy. Pt dx with recent UTI on 5/25/24 placed on abx (unclear abx but drfirst showed Nitrofurantoin rx). Over the past few days has becomign increasingly confused, somnolent, and hallucinating. Decreased PO intake. Noted to be hypotensive and hypothermic on arrival. Tachycardic on monitor showing AFIB RVR. Given Lopressor 5mg IV and Diltiazem 10mg IV x1 with improvement but developed hypotension. Given 2.5L IVFB LR in ED however recurrently hypotensive. Lactate 6.8 in ED. CTA Chest/Abd/Pelvis with multiple findings. Found to have MELCHOR and started on Heparin gtt. BL moderate-large pleural effusions. IVF lowered and CT Surgery Consulted for possible thora. BL LL infiltrates suspicious for PNA, Stercoral Colitis, and UA with Acute UTI. Given Ceftriaxone/Zosyn/Vanco for empiric IV ABX. Pt seen/examined. Placed on shahriar hugger. IVF and IV ABX adjusted. Very guarded prognosis. Full Code per ED provider discussed with family.       Acute hypoxic respiratory failure 2/2 CHF  b/l pleural effusions r/o pna  r/o aspiration  Stercoral colitis  UTI  Hypothermia  Thrombus left atrial appendage  biliary gallstones/sludge      - f/u bcx  - Ua +  - f/u ucx neg  - per son was being treated for UTI with macrobid at home  - thoracentesis deferred per family wishes for now  - Continue empiric zosyn   - dc vanco  - f/u TTE ef <20%  - c/w diuresis and AC  - Trend Fever  - Trend Leukocytosis  - trend LFT  - guarded prognosis. palliative f/u    d/w RN, pharmacy, hospitalist     Will Follow

## 2024-06-07 NOTE — PROGRESS NOTE ADULT - SUBJECTIVE AND OBJECTIVE BOX
Patient is a 89y old  Female who presents with a chief complaint of Severe Sepsis  HFrEF (07 Jun 2024 09:52)      Patient seen and examined at bedside in am , she looks liek in pain at times during nurse assesment   HR is high intermittently   TTE result noted   dw. cardiology team this am with Dr Michael Mccullough at the bedside updated   on warming blanket temp 97.7         ALLERGIES:  No Known Allergies    MEDICATIONS  (STANDING):  dextrose 10% Bolus 125 milliLiter(s) IV Bolus once  dextrose 5% + sodium chloride 0.9%. 1000 milliLiter(s) (70 mL/Hr) IV Continuous <Continuous>  dextrose 5%. 1000 milliLiter(s) (100 mL/Hr) IV Continuous <Continuous>  dextrose 5%. 1000 milliLiter(s) (50 mL/Hr) IV Continuous <Continuous>  dextrose 50% Injectable 25 Gram(s) IV Push once  dextrose 50% Injectable 12.5 Gram(s) IV Push once  furosemide   Injectable 40 milliGRAM(s) IV Push two times a day  glucagon  Injectable 1 milliGRAM(s) IntraMuscular once  heparin  Infusion.  Unit(s)/Hr (11 mL/Hr) IV Continuous <Continuous>  insulin lispro (ADMELOG) corrective regimen sliding scale   SubCutaneous every 6 hours  levETIRAcetam   Injectable 750 milliGRAM(s) IV Push every 12 hours  metoprolol tartrate 25 milliGRAM(s) Oral two times a day  nystatin Powder 1 Application(s) Topical two times a day  piperacillin/tazobactam IVPB.. 3.375 Gram(s) IV Intermittent every 8 hours  vancomycin  IVPB 1000 milliGRAM(s) IV Intermittent every 12 hours    MEDICATIONS  (PRN):  acetaminophen     Tablet .. 650 milliGRAM(s) Oral every 6 hours PRN Temp greater or equal to 38C (100.4F), Mild Pain (1 - 3)  aluminum hydroxide/magnesium hydroxide/simethicone Suspension 30 milliLiter(s) Oral every 4 hours PRN Dyspepsia  dextrose Oral Gel 15 Gram(s) Oral once PRN Blood Glucose LESS THAN 70 milliGRAM(s)/deciliter  heparin   Injectable 2000 Unit(s) IV Push every 6 hours PRN For aPTT between 40 - 57  heparin   Injectable 4500 Unit(s) IV Push every 6 hours PRN For aPTT less than 40  melatonin 3 milliGRAM(s) Oral at bedtime PRN Insomnia  morphine  - Injectable 2 milliGRAM(s) IV Push four times a day PRN pain or SOB  ondansetron Injectable 4 milliGRAM(s) IV Push every 8 hours PRN Nausea and/or Vomiting    Vital Signs Last 24 Hrs  T(F): 97 (07 Jun 2024 10:15), Max: 98.3 (06 Jun 2024 15:04)  HR: 111 (07 Jun 2024 10:15) (94 - 132)  BP: 109/70 (07 Jun 2024 10:15) (99/67 - 133/66)  RR: 20 (07 Jun 2024 10:15) (18 - 20)  SpO2: 99% (07 Jun 2024 10:15) (93% - 100%)  I&O's Summary      PHYSICAL EXAM:  General:   ENT:   Neck:   Lungs:   Cardio: RRR, S1/S2, No murmur  Abdomen: Soft, Nontender, Nondistended; Bowel sounds present  Extremities: No calf tenderness, No pitting edema    LABS:                        12.3   8.02  )-----------( 133      ( 07 Jun 2024 01:40 )             37.5     06-06    131  |  94  |  34.1  ----------------------------<  433  3.5   |  18.0  |  0.70    Ca    7.9      06 Jun 2024 08:18  Phos  2.2     06-06  Mg     1.9     06-06    TPro  4.5  /  Alb  2.3  /  TBili  0.4  /  DBili  x   /  AST  165  /  ALT  100  /  AlkPhos  51  06-06          PT/INR - ( 06 Jun 2024 01:41 )   PT: 16.2 sec;   INR: 1.48 ratio         PTT - ( 07 Jun 2024 10:26 )  PTT:160.4 sec  Lactate, Blood: 2.4 mmol/L (06-06 @ 08:18)            TSH 1.07   TSH with FT4 reflex --  Total T3 --    01:12 - VBG - pH: 7.350 | pCO2: 35    | pO2: 113   | Lactate: 3.60   17:40 - VBG - pH:       | pCO2:       | pO2:       | Lactate: 6.80   15:07 - VBG - pH: 7.490 | pCO2: 25    | pO2: 215   | Lactate: 5.30             POCT Blood Glucose.: 157 mg/dL (07 Jun 2024 05:32)  POCT Blood Glucose.: 138 mg/dL (07 Jun 2024 00:14)  POCT Blood Glucose.: 97 mg/dL (06 Jun 2024 17:50)  POCT Blood Glucose.: 95 mg/dL (06 Jun 2024 13:51)      Urinalysis Basic - ( 06 Jun 2024 08:18 )    Color: x / Appearance: x / SG: x / pH: x  Gluc: 433 mg/dL / Ketone: x  / Bili: x / Urobili: x   Blood: x / Protein: x / Nitrite: x   Leuk Esterase: x / RBC: x / WBC x   Sq Epi: x / Non Sq Epi: x / Bacteria: x        Culture - Urine (collected 05 Jun 2024 15:47)  Source: Catheterized Catheterized  Final Report (06 Jun 2024 18:26):    <10,000 CFU/mL Normal Urogenital Briana    Culture - Blood (collected 05 Jun 2024 15:07)  Source: .Blood Blood-Peripheral  Preliminary Report (06 Jun 2024 23:02):    No growth at 24 hours    Culture - Blood (collected 05 Jun 2024 15:00)  Source: .Blood Blood-Peripheral  Preliminary Report (06 Jun 2024 23:02):    No growth at 24 hours        RADIOLOGY & ADDITIONAL TESTS:       Patient is a 89y old  Female who presents with a chief complaint of Severe Sepsis  HFrEF (07 Jun 2024 09:52)      Patient seen and examined at bedside in am , she looks liek in pain at times during nurse assesment   HR is high intermittently   TTE result noted   dw. cardiology team this am with Dr Michael Mccullough at the bedside updated   on warming blanket temp 97.7       ROS   can not obtain lethargic , not responding     ALLERGIES:  No Known Allergies    MEDICATIONS  (STANDING):  dextrose 10% Bolus 125 milliLiter(s) IV Bolus once  dextrose 5% + sodium chloride 0.9%. 1000 milliLiter(s) (70 mL/Hr) IV Continuous <Continuous>  dextrose 5%. 1000 milliLiter(s) (100 mL/Hr) IV Continuous <Continuous>  dextrose 5%. 1000 milliLiter(s) (50 mL/Hr) IV Continuous <Continuous>  dextrose 50% Injectable 25 Gram(s) IV Push once  dextrose 50% Injectable 12.5 Gram(s) IV Push once  furosemide   Injectable 40 milliGRAM(s) IV Push two times a day  glucagon  Injectable 1 milliGRAM(s) IntraMuscular once  heparin  Infusion.  Unit(s)/Hr (11 mL/Hr) IV Continuous <Continuous>  insulin lispro (ADMELOG) corrective regimen sliding scale   SubCutaneous every 6 hours  levETIRAcetam   Injectable 750 milliGRAM(s) IV Push every 12 hours  metoprolol tartrate 25 milliGRAM(s) Oral two times a day  nystatin Powder 1 Application(s) Topical two times a day  piperacillin/tazobactam IVPB.. 3.375 Gram(s) IV Intermittent every 8 hours  vancomycin  IVPB 1000 milliGRAM(s) IV Intermittent every 12 hours    MEDICATIONS  (PRN):  acetaminophen     Tablet .. 650 milliGRAM(s) Oral every 6 hours PRN Temp greater or equal to 38C (100.4F), Mild Pain (1 - 3)  aluminum hydroxide/magnesium hydroxide/simethicone Suspension 30 milliLiter(s) Oral every 4 hours PRN Dyspepsia  dextrose Oral Gel 15 Gram(s) Oral once PRN Blood Glucose LESS THAN 70 milliGRAM(s)/deciliter  heparin   Injectable 2000 Unit(s) IV Push every 6 hours PRN For aPTT between 40 - 57  heparin   Injectable 4500 Unit(s) IV Push every 6 hours PRN For aPTT less than 40  melatonin 3 milliGRAM(s) Oral at bedtime PRN Insomnia  morphine  - Injectable 2 milliGRAM(s) IV Push four times a day PRN pain or SOB  ondansetron Injectable 4 milliGRAM(s) IV Push every 8 hours PRN Nausea and/or Vomiting    Vital Signs Last 24 Hrs  T(F): 97 (07 Jun 2024 10:15), Max: 98.3 (06 Jun 2024 15:04)  HR: 111 (07 Jun 2024 10:15) (94 - 132)  BP: 109/70 (07 Jun 2024 10:15) (99/67 - 133/66)  RR: 20 (07 Jun 2024 10:15) (18 - 20)  SpO2: 99% (07 Jun 2024 10:15) (93% - 100%)  I&O's Summary      PHYSICAL EXAM:  General: lethargic , no resp distress    Neck: supple , no JVD   Lungs: CTA anteriorly   Cardio: RRR, S1/S2, No murmur  Abdomen: Soft, Nontender, Nondistended; Bowel sounds present  Extremities: No calf tenderness, No pitting edema  Skin : pale color       LABS:                        12.3   8.02  )-----------( 133      ( 07 Jun 2024 01:40 )             37.5     06-06    131  |  94  |  34.1  ----------------------------<  433  3.5   |  18.0  |  0.70    Ca    7.9      06 Jun 2024 08:18  Phos  2.2     06-06  Mg     1.9     06-06    TPro  4.5  /  Alb  2.3  /  TBili  0.4  /  DBili  x   /  AST  165  /  ALT  100  /  AlkPhos  51  06-06          PT/INR - ( 06 Jun 2024 01:41 )   PT: 16.2 sec;   INR: 1.48 ratio         PTT - ( 07 Jun 2024 10:26 )  PTT:160.4 sec  Lactate, Blood: 2.4 mmol/L (06-06 @ 08:18)            TSH 1.07   TSH with FT4 reflex --  Total T3 --    01:12 - VBG - pH: 7.350 | pCO2: 35    | pO2: 113   | Lactate: 3.60   17:40 - VBG - pH:       | pCO2:       | pO2:       | Lactate: 6.80   15:07 - VBG - pH: 7.490 | pCO2: 25    | pO2: 215   | Lactate: 5.30             POCT Blood Glucose.: 157 mg/dL (07 Jun 2024 05:32)  POCT Blood Glucose.: 138 mg/dL (07 Jun 2024 00:14)  POCT Blood Glucose.: 97 mg/dL (06 Jun 2024 17:50)  POCT Blood Glucose.: 95 mg/dL (06 Jun 2024 13:51)      Urinalysis Basic - ( 06 Jun 2024 08:18 )    Color: x / Appearance: x / SG: x / pH: x  Gluc: 433 mg/dL / Ketone: x  / Bili: x / Urobili: x   Blood: x / Protein: x / Nitrite: x   Leuk Esterase: x / RBC: x / WBC x   Sq Epi: x / Non Sq Epi: x / Bacteria: x        Culture - Urine (collected 05 Jun 2024 15:47)  Source: Catheterized Catheterized  Final Report (06 Jun 2024 18:26):    <10,000 CFU/mL Normal Urogenital Briana    Culture - Blood (collected 05 Jun 2024 15:07)  Source: .Blood Blood-Peripheral  Preliminary Report (06 Jun 2024 23:02):    No growth at 24 hours    Culture - Blood (collected 05 Jun 2024 15:00)  Source: .Blood Blood-Peripheral  Preliminary Report (06 Jun 2024 23:02):    No growth at 24 hours        RADIOLOGY & ADDITIONAL TESTS:    tttte< from: TTE W or WO Ultrasound Enhancing Agent (06.06.24 @ 16:19) >   1. Left ventricular cavity is normal in size. Left ventricular systolic function is severely decreased with an ejection fraction of 24 % by Hillman's method of disks with an ejection fraction visually estimated at <20 %.   2. The left ventricular diastolic function is indeterminate. Analysis of left ventricular diastolic function and filling pressure is made challenging by the presence of atrial fibrillation.   3. Normal right ventricular cavity size and moderately to severely reduced systolic function.   4. The left atrium is normal in size.   5. Moderate to severe tricuspid regurgitation.   6. Estimated pulmonary artery systolic pressure is 49 mmHg, consistent with moderate pulmonary hypertension.   7. Moderate mitral regurgitation.   8. Trileaflet aortic valve, with mild aortic regurgitation.   9. Moderate bilateral pleural effusion noted.  10. No pericardial effusion seen.  11. No prior echocardiogram is available for comparison.      < end of copied text >

## 2024-06-07 NOTE — PROGRESS NOTE ADULT - CONVERSATION DETAILS
Met with family - they have decided not to pursue a chest tube and wish for her to go to hospice  Discussed comfort care plan whereby focus is transitioned to pain and symptom management and minimizing interventions such as blood work, vitals, imaging to avoid further burden to the patient. Informed the  use of medications to alleviate pain and suffering.   Evelin would like for her sister to be present before she makes the decision.

## 2024-06-07 NOTE — CHART NOTE - NSCHARTNOTEFT_GEN_A_CORE
Case discussed with Dr. Flores in AM thoracic surgery rounds. At this time family does not wish for chest tube or thoracentesis to drain pleural effusions. If goals of care changes, please reconsult for intervention.
Palliative care social work note.    SW attempted to meet with family at bedside. although belongings were there family had not arrived back to bedside. SW reviewed clinical record and contacted son Krunal to introduce palliatives care service and further clarify advance directives as it was noted patient has HCP. SW requested clarification on who is HCP and direct number for HCP. SW left message for son and provided SW call back number. Palliative care to follow.
pt confused, unable to consent for herself or speak her wishes, son krunal at bedside who is pt's primary care giver.  updated krunal with pt's current issues and that thoracic surgery team was called for possible pigtail insertion to remove fluid. Krunal unsure how aggressive him and sisters want to be. it was discussed that pt may or may not benefit from drainage of fluid as her respiratory status is stable on minimal oxygen requirements and doesn't seem to be her primary issue, also that there is no urgency in making a decision at this moment to place a tube or not. krunal asked if thoracic surgery can come back and speak to his sister Evelin when she is at bedside later today to assist krunal in decision making.    will re-evaluate this afternoon  will need heparin gtt off minimum 2 hours for placement of tube if family agrees, risk/benefits of this were d/w krunal as well.
spoke with son ketty (primary care giver), daughter suzanne and additional son at bedside, they prefer to keep pt comfortable for now and reassess situation in 24 hours. thoracic surgery will follow up with family tomorrow  d/w Dr. Flores and Dr. Albert
Pt is admitted after MN today , H and P reviewed     seen in am , meet the daughter son at the bedside around 1   Pt is unresponsive , warming blanket in place     Vital Signs Last 24 Hrs  T(C): 36.7 (06 Jun 2024 13:05), Max: 36.9 (05 Jun 2024 17:20)  T(F): 98 (06 Jun 2024 13:05), Max: 98.4 (05 Jun 2024 17:20)  HR: 94 (06 Jun 2024 13:05) (80 - 133)  BP: 105/68 (06 Jun 2024 13:05) (82/52 - 124/68)  BP(mean): 80 (06 Jun 2024 00:42) (72 - 86)  RR: 19 (06 Jun 2024 13:05) (16 - 24)  SpO2: 99% (06 Jun 2024 13:05) (89% - 100%)    Parameters below as of 06 Jun 2024 13:05  Patient On (Oxygen Delivery Method): nasal cannula  O2 Flow (L/min): 3    General : lethrgic , not responding   Cardio: Irregularly Irregular, s1/s2  Resp: +Decreased BS BL throughout  Abd: Soft, Non-tender, Non-distended, no rebound/guarding/rigidity    Assesment / Plan     89F wtih PMHX CVA with residual L sided weakness, AFIB, Hypothyroidism, DM2, Chronic Pain, Seizure Disorder presents to [facility] ER for AMS and Lethargy admitted for Severe Sepsis 2/2 Multiple Sources of Infection including Acute UTI, Bibasilar PNA, Stercoral Colitis, AMS 2/2 Toxic Metabolic Encephalopathy, MELCHOR Thrombus, AFIB RVR, NSTEMI, Acute Hypoxic Respiratory Failure, BL Large Pleural Effusions.      1-Severe Sepsis 2/2 Multiple Sources of Infection including Acute UTI, Bibasilar PNA, Stercoral Colitis1-  -CT Chest . abd pelvis result noted   -SIRS +Tachycardia/Hypothermia +AMS and Lactic Acidosis + UTI/Stercoral Colitis/Bibasilar PNA etc  -Admit to SDU  -VS q2, warming blanket   empirical iv antibiotics   monitor cx result   -ID Consulted  -MICU Consulted     2-AMS 2/2 Toxic Metabolic Encephalopathy 2/2 Sepsis and Above Infection  -CTH WO negative  NPO , remains lethargic     3-MELCHOR Thrombus, AFIB RVR, NSTEMI  -Check TTE  -Heparin gtt for MELCHOR Thrombus  -Diltiazem 10mg IV and Lopressor 5mg IV x1 in ED for RVR  -Limit AVN Blockade with recurrent hypotension  -Cardiology Consult appreciated       4-Acute Hypoxic Respiratory Failure 2/2 Bilateral Large Pleural Effusions. penumonia . ? aspiration   -Lower Hydration to IVF NSS 75cc/hr for hypotension and lactic acidosis  -CT Surgery Consulted for thoracentesis     5-DM2  -Hold Metformin 500mg q24  -NPO/ISS q6      6-Advanced Care Planning  discussed with daughter Evelin and son at the bedside   Pt;s has another daughter out of state  Pt is leaning toward DNR/ DNI   have to obtain paperwork from [place] daughter who is not here , she may be the HCP   -Very Guarded Prognosis  -Palliative Care Consult appreciated

## 2024-06-07 NOTE — PROGRESS NOTE ADULT - SUBJECTIVE AND OBJECTIVE BOX
CC:  Follow up GOC , Symptoms    OVERNIGHT EVENTS:  family does not want CT      Present Symptoms:   Dyspnea:  No    Nausea/Vomiting:  No   Anxiety:  No    Depression:   Unable  Fatigue:  Yes     Loss of appetite:  Yes     Constipation: Not Reported   Pain:             Location            Duration            Character            Severity            Factors            Effect    Pain AD Score:  [email or web address] (press ctrl + left click to view)    Review of Systems: Reviewed as above  Further ROS unable limited to  obtain due to poor mentation       MEDICATIONS  (STANDING):  dextrose 10% Bolus 125 milliLiter(s) IV Bolus once  dextrose 5% + sodium chloride 0.9%. 1000 milliLiter(s) (50 mL/Hr) IV Continuous <Continuous>  dextrose 5%. 1000 milliLiter(s) (100 mL/Hr) IV Continuous <Continuous>  dextrose 5%. 1000 milliLiter(s) (50 mL/Hr) IV Continuous <Continuous>  dextrose 50% Injectable 25 Gram(s) IV Push once  dextrose 50% Injectable 12.5 Gram(s) IV Push once  furosemide   Injectable 40 milliGRAM(s) IV Push two times a day  glucagon  Injectable 1 milliGRAM(s) IntraMuscular once  heparin  Infusion.  Unit(s)/Hr (11 mL/Hr) IV Continuous <Continuous>  insulin lispro (ADMELOG) corrective regimen sliding scale   SubCutaneous every 6 hours  levETIRAcetam   Injectable 750 milliGRAM(s) IV Push every 12 hours  metoprolol tartrate Injectable 2.5 milliGRAM(s) IV Push every 8 hours  nystatin Powder 1 Application(s) Topical two times a day  piperacillin/tazobactam IVPB.. 3.375 Gram(s) IV Intermittent every 8 hours  potassium chloride  10 mEq/100 mL IVPB 10 milliEquivalent(s) IV Intermittent every 1 hour    MEDICATIONS  (PRN):  acetaminophen     Tablet .. 650 milliGRAM(s) Oral every 6 hours PRN Temp greater or equal to 38C (100.4F), Mild Pain (1 - 3)  aluminum hydroxide/magnesium hydroxide/simethicone Suspension 30 milliLiter(s) Oral every 4 hours PRN Dyspepsia  dextrose Oral Gel 15 Gram(s) Oral once PRN Blood Glucose LESS THAN 70 milliGRAM(s)/deciliter  heparin   Injectable 2000 Unit(s) IV Push every 6 hours PRN For aPTT between 40 - 57  heparin   Injectable 4500 Unit(s) IV Push every 6 hours PRN For aPTT less than 40  melatonin 3 milliGRAM(s) Oral at bedtime PRN Insomnia  morphine  - Injectable 2 milliGRAM(s) IV Push four times a day PRN pain or SOB  ondansetron Injectable 4 milliGRAM(s) IV Push every 8 hours PRN Nausea and/or Vomiting      PHYSICAL EXAM:    Vital Signs Last 24 Hrs  T(C): 36.2 (07 Jun 2024 13:23), Max: 36.8 (07 Jun 2024 05:00)  T(F): 97.2 (07 Jun 2024 13:23), Max: 98.3 (07 Jun 2024 05:00)  HR: 120 (07 Jun 2024 13:23) (106 - 132)  BP: 115/85 (07 Jun 2024 13:23) (99/67 - 133/66)  BP(mean): --  RR: 20 (07 Jun 2024 13:23) (20 - 20)  SpO2: 98% (07 Jun 2024 13:23) (93% - 100%)    Parameters below as of 07 Jun 2024 13:23  Patient On (Oxygen Delivery Method): nasal cannula  O2 Flow (L/min): 3      Karnofsky: 10 %  General: Frail elderly woman NAD    HEENT:  NCAT      Lungs: comfortable  non labored  CV:  RR  GI:   soft NTND  MSK: weak bedbound  Skin:  warm/dry  Neuro  AOxO lethargic   Psych non agitated    LABS:                          12.3   8.02  )-----------( 133      ( 07 Jun 2024 01:40 )             37.5     06-06    131<L>  |  94<L>  |  34.1<H>  ----------------------------<  433<H>  3.5   |  18.0<L>  |  0.70    Ca    7.9<L>      06 Jun 2024 08:18  Phos  2.2     06-06  Mg     1.9     06-06    TPro  4.5<L>  /  Alb  2.3<L>  /  TBili  0.4  /  DBili  x   /  AST  165<H>  /  ALT  100<H>  /  AlkPhos  51  06-06    PT/INR - ( 06 Jun 2024 01:41 )   PT: 16.2 sec;   INR: 1.48 ratio         PTT - ( 07 Jun 2024 10:26 )  PTT:160.4 sec  Urinalysis Basic - ( 06 Jun 2024 08:18 )    Color: x / Appearance: x / SG: x / pH: x  Gluc: 433 mg/dL / Ketone: x  / Bili: x / Urobili: x   Blood: x / Protein: x / Nitrite: x   Leuk Esterase: x / RBC: x / WBC x   Sq Epi: x / Non Sq Epi: x / Bacteria: x      I&O's Summary      RADIOLOGY & ADDITIONAL STUDIES:  Imaging Reviewed  (  x )  < from: TTE W or WO Ultrasound Enhancing Agent (06.06.24 @ 16:19) >  _______________________________________________________________________________________     CONCLUSIONS:      1. Left ventricular cavity is normal in size. Left ventricular systolic function is severely decreased with an ejection fraction of 24 % by Hillman's method of disks with an ejection fraction visually estimated at <20 %.   2. The left ventricular diastolic function is indeterminate. Analysis of left ventricular diastolic function and filling pressure is made challenging by the presence of atrial fibrillation.   3. Normal right ventricular cavity size and moderately to severely reduced systolic function.   4. The left atrium is normal in size.   5. Moderate to severe tricuspid regurgitation.   6. Estimated pulmonary artery systolic pressure is 49 mmHg, consistent with moderate pulmonary hypertension.   7. Moderate mitral regurgitation.   8. Trileaflet aortic valve, with mild aortic regurgitation.   9. Moderate bilateral pleural effusion noted.  10. No pericardial effusion seen.  11. No prior echocardiogram is available for comparison.    ________________________________________________________________________________________    < end of copied text >

## 2024-06-07 NOTE — PROGRESS NOTE ADULT - TIME BILLING
Time spent with review of chart documents, labs, imaging. Direct patient assessment,  formulation of care plan. Discussion with  Interdisciplinary  team  Dr. Ye  with an additional  ACP  of 16  min This time is exclusive of the encounter

## 2024-06-08 ENCOUNTER — TRANSCRIPTION ENCOUNTER (OUTPATIENT)
Age: 89
End: 2024-06-08

## 2024-06-08 VITALS — WEIGHT: 129.19 LBS

## 2024-06-08 LAB
APTT BLD: 92.8 SEC — HIGH (ref 24.5–35.6)
GLUCOSE BLDC GLUCOMTR-MCNC: 152 MG/DL — HIGH (ref 70–99)
GLUCOSE BLDC GLUCOMTR-MCNC: 153 MG/DL — HIGH (ref 70–99)
HCT VFR BLD CALC: 34.6 % — SIGNIFICANT CHANGE UP (ref 34.5–45)
HGB BLD-MCNC: 11.1 G/DL — LOW (ref 11.5–15.5)
LEGIONELLA AG UR QL: NEGATIVE — SIGNIFICANT CHANGE UP
MCHC RBC-ENTMCNC: 32.1 GM/DL — SIGNIFICANT CHANGE UP (ref 32–36)
MCHC RBC-ENTMCNC: 32.4 PG — SIGNIFICANT CHANGE UP (ref 27–34)
MCV RBC AUTO: 100.9 FL — HIGH (ref 80–100)
NRBC # BLD: 1 /100 WBCS — HIGH (ref 0–0)
PLATELET # BLD AUTO: 129 K/UL — LOW (ref 150–400)
RBC # BLD: 3.43 M/UL — LOW (ref 3.8–5.2)
RBC # FLD: 16.9 % — HIGH (ref 10.3–14.5)
S PNEUM AG UR QL: NEGATIVE — SIGNIFICANT CHANGE UP
WBC # BLD: 7.25 K/UL — SIGNIFICANT CHANGE UP (ref 3.8–10.5)
WBC # FLD AUTO: 7.25 K/UL — SIGNIFICANT CHANGE UP (ref 3.8–10.5)

## 2024-06-08 PROCEDURE — 99239 HOSP IP/OBS DSCHRG MGMT >30: CPT

## 2024-06-08 RX ORDER — MORPHINE SULFATE 50 MG/1
2 CAPSULE, EXTENDED RELEASE ORAL
Qty: 0 | Refills: 0 | DISCHARGE
Start: 2024-06-08

## 2024-06-08 RX ORDER — METFORMIN HYDROCHLORIDE 850 MG/1
1 TABLET ORAL
Refills: 0 | DISCHARGE

## 2024-06-08 RX ORDER — GABAPENTIN 400 MG/1
6 CAPSULE ORAL
Refills: 0 | DISCHARGE

## 2024-06-08 RX ORDER — OXYCODONE AND ACETAMINOPHEN 5; 325 MG/1; MG/1
1 TABLET ORAL
Refills: 0 | DISCHARGE

## 2024-06-08 RX ORDER — LEVOTHYROXINE SODIUM 125 MCG
1 TABLET ORAL
Refills: 0 | DISCHARGE

## 2024-06-08 RX ORDER — LEVETIRACETAM 250 MG/1
1 TABLET, FILM COATED ORAL
Refills: 0 | DISCHARGE

## 2024-06-08 RX ORDER — LEVETIRACETAM 250 MG/1
7.5 TABLET, FILM COATED ORAL
Qty: 0 | Refills: 0 | DISCHARGE
Start: 2024-06-08

## 2024-06-08 RX ORDER — SIMVASTATIN 20 MG/1
1 TABLET, FILM COATED ORAL
Refills: 0 | DISCHARGE

## 2024-06-08 RX ORDER — ESCITALOPRAM OXALATE 10 MG/1
1 TABLET, FILM COATED ORAL
Refills: 0 | DISCHARGE

## 2024-06-08 RX ADMIN — NYSTATIN CREAM 1 APPLICATION(S): 100000 CREAM TOPICAL at 05:03

## 2024-06-08 RX ADMIN — MORPHINE SULFATE 2 MILLIGRAM(S): 50 CAPSULE, EXTENDED RELEASE ORAL at 06:20

## 2024-06-08 RX ADMIN — MORPHINE SULFATE 2 MILLIGRAM(S): 50 CAPSULE, EXTENDED RELEASE ORAL at 07:00

## 2024-06-08 RX ADMIN — Medication 1: at 00:31

## 2024-06-08 RX ADMIN — Medication 2.5 MILLIGRAM(S): at 05:05

## 2024-06-08 RX ADMIN — Medication 1: at 06:58

## 2024-06-08 RX ADMIN — Medication 40 MILLIGRAM(S): at 05:03

## 2024-06-08 RX ADMIN — LEVETIRACETAM 750 MILLIGRAM(S): 250 TABLET, FILM COATED ORAL at 05:02

## 2024-06-08 RX ADMIN — PIPERACILLIN AND TAZOBACTAM 25 GRAM(S): 4; .5 INJECTION, POWDER, LYOPHILIZED, FOR SOLUTION INTRAVENOUS at 05:02

## 2024-06-08 RX ADMIN — HEPARIN SODIUM 400 UNIT(S)/HR: 5000 INJECTION INTRAVENOUS; SUBCUTANEOUS at 05:03

## 2024-06-08 NOTE — DISCHARGE NOTE PROVIDER - DETAILS OF MALNUTRITION DIAGNOSIS/DIAGNOSES
This patient has been assessed with a concern for Malnutrition and was treated during this hospitalization for the following Nutrition diagnosis/diagnoses:     -  06/08/2024: Moderate protein-calorie malnutrition

## 2024-06-08 NOTE — DIETITIAN INITIAL EVALUATION ADULT - PERTINENT MEDS FT
MEDICATIONS  (STANDING):  dextrose 5%. 1000 milliLiter(s) (100 mL/Hr) IV Continuous <Continuous>  insulin lispro (ADMELOG) corrective regimen sliding scale   SubCutaneous every 6 hours    MEDICATIONS  (PRN):  ondansetron Injectable 4 milliGRAM(s) IV Push every 8 hours PRN Nausea and/or Vomiting

## 2024-06-08 NOTE — DIETITIAN INITIAL EVALUATION ADULT - PERTINENT LABORATORY DATA
POCT Blood Glucose.: 153 mg/dL (06-08-24 @ 06:50)  A1C with Estimated Average Glucose Result: 5.5 % (06-07-24 @ 01:40)

## 2024-06-08 NOTE — DIETITIAN INITIAL EVALUATION ADULT - OTHER INFO
89F with PMHX CVA with residual L sided weakness, AFIB, Hypothyroidism, DM2, Chronic Pain, Seizure Disorder presents to [facility] ER for AMS and Lethargy. Pt dx with recent UTI on 5/25/24 placed on abx. Over the past few days has becoming increasingly confused, somnolent, and hallucinating. Decreased PO intake. Noted to be hypotensive and hypothermic on arrival. Tachycardic on monitor showing AFIB RVR. CTA Chest/Abd/Pelvis with multiple findings. Found to have MELCHOR and started on Heparin gtt. BL moderate-large pleural effusions.   # severe CM with b/l pleural effusion   # Severe Sepsis 2/2 Multiple Sources of Infection including Acute UTI, Bibasilar PNA, Stercoral Colitis  # AMS 2/2 Toxic Metabolic Encephalopathy due to infection

## 2024-06-08 NOTE — DISCHARGE NOTE PROVIDER - NSDCMRMEDTOKEN_GEN_ALL_CORE_FT
levETIRAcetam 100 mg/mL intravenous solution: 7.5 milliliter(s) intravenous every 12 hours  morphine: 2 milligram(s) intravenous 4 times a day as needed for pain or dyspnea

## 2024-06-08 NOTE — DIETITIAN INITIAL EVALUATION ADULT - ETIOLOGY
related to inability to meet sufficient protein-energy needs in setting of severe sepsis, multiple wounds, acute hypoxic respiratory failure 2/2 B/L pleural effusions

## 2024-06-08 NOTE — PROGRESS NOTE ADULT - NUTRITIONAL ASSESSMENT
This patient has been assessed with a concern for Malnutrition and has been determined to have a diagnosis/diagnoses of Moderate protein-calorie malnutrition.    This patient is being managed with:   Diet NPO-  Except Medications  With Ice Chips/Sips of Water  Entered: Jun 6 2024  4:05AM

## 2024-06-08 NOTE — PROGRESS NOTE ADULT - SUBJECTIVE AND OBJECTIVE BOX
[facility] Division of Hospital Medicine    SUBJECTIVE / OVERNIGHT EVENTS:  No events    Patient is somnolent appears comfortable    MEDICATIONS  (STANDING):  levETIRAcetam   Injectable 750 milliGRAM(s) IV Push every 12 hours  nystatin Powder 1 Application(s) Topical two times a day    MEDICATIONS  (PRN):  acetaminophen     Tablet .. 650 milliGRAM(s) Oral every 6 hours PRN Temp greater or equal to 38C (100.4F), Mild Pain (1 - 3)  aluminum hydroxide/magnesium hydroxide/simethicone Suspension 30 milliLiter(s) Oral every 4 hours PRN Dyspepsia  artificial  tears Solution 2 Drop(s) Both EYES every 1 hour PRN Dry Eyes  melatonin 3 milliGRAM(s) Oral at bedtime PRN Insomnia  morphine  - Injectable 2 milliGRAM(s) IV Push four times a day PRN pain or SOB  ondansetron Injectable 4 milliGRAM(s) IV Push every 8 hours PRN Nausea and/or Vomiting        I&O's Summary      PHYSICAL EXAM:  Vital Signs Last 24 Hrs  T(C): 36.6 (08 Jun 2024 04:40), Max: 36.6 (08 Jun 2024 04:40)  T(F): 97.8 (08 Jun 2024 04:40), Max: 97.8 (08 Jun 2024 04:40)  HR: 105 (08 Jun 2024 10:14) (86 - 121)  BP: 105/68 (08 Jun 2024 04:40) (92/71 - 115/85)  BP(mean): --  RR: 20 (08 Jun 2024 10:14) (19 - 20)  SpO2: 99% (08 Jun 2024 10:14) (94% - 100%)    Parameters below as of 08 Jun 2024 10:14  Patient On (Oxygen Delivery Method): nasal cannula  O2 Flow (L/min): 3          CONSTITUTIONAL: NAD, appears stated age  ENMT: Moist oral mucosa, no pharyngeal injection or exudates; normal dentition  RESPIRATORY: Normal respiratory effort; clear to auscultation bilaterally  CARDIOVASCULAR: Regular rate and rhythm, normal S1 and S2, no murmur/rub/gallop; Peripheral pulses are 2+ bilaterally  ABDOMEN: Nontender to palpation, normoactive bowel sounds, no rebound/guarding;   MUSCLOSKELETAL:  No clubbing or cyanosis of digits; no joint swelling or tenderness to palpation  PSYCH: Alert, not oriented, lethargic  NEUROLOGY: CN 2-12 are intact and symmetric; no gross sensory deficits;   SKIN: No rashes; no palpable lesions    LABS:                        11.1   7.25  )-----------( 129      ( 08 Jun 2024 03:53 )             34.6           PTT - ( 08 Jun 2024 03:53 )  PTT:92.8 sec          Culture - Urine (collected 05 Jun 2024 15:47)  Source: Catheterized Catheterized  Final Report (06 Jun 2024 18:26):    <10,000 CFU/mL Normal Urogenital Briana    Culture - Blood (collected 05 Jun 2024 15:07)  Source: .Blood Blood-Peripheral  Preliminary Report (07 Jun 2024 23:01):    No growth at 48 Hours    Culture - Blood (collected 05 Jun 2024 15:00)  Source: .Blood Blood-Peripheral  Preliminary Report (07 Jun 2024 23:01):    No growth at 48 Hours      CAPILLARY BLOOD GLUCOSE      POCT Blood Glucose.: 153 mg/dL (08 Jun 2024 06:50)  POCT Blood Glucose.: 152 mg/dL (08 Jun 2024 00:27)  POCT Blood Glucose.: 138 mg/dL (07 Jun 2024 18:17)        RADIOLOGY & ADDITIONAL TESTS:  Results Reviewed:   Imaging Personally Reviewed:  Electrocardiogram Personally Reviewed:

## 2024-06-08 NOTE — DIETITIAN INITIAL EVALUATION ADULT - ORAL INTAKE PTA/DIET HISTORY
Nutrition consult completed. Spoke with family at bedside today. Pt is currently NPO. Poor po intake at meals prior to admission. Did not follow a diet at home as per family, took vitamin b6, vitamin b12, and vitamin d daily. UBW: 117# - 1 month ago as per daughter. NKFA. Family discussed updated GOC to transfer to hospice. No further intervention requested at this time, as well as no feeding tube as per advanced directives. RD to remain available.

## 2024-06-08 NOTE — DISCHARGE NOTE PROVIDER - NSDCCPCAREPLAN_GEN_ALL_CORE_FT
PRINCIPAL DISCHARGE DIAGNOSIS  Diagnosis: Severe sepsis  Assessment and Plan of Treatment:       SECONDARY DISCHARGE DIAGNOSES  Diagnosis: Atrial fibrillation with RVR  Assessment and Plan of Treatment:     Diagnosis: Bilateral pleural effusion  Assessment and Plan of Treatment:     Diagnosis: Change in mental status  Assessment and Plan of Treatment:     Diagnosis: Colitis  Assessment and Plan of Treatment:

## 2024-06-08 NOTE — DISCHARGE NOTE PROVIDER - ATTENDING DISCHARGE PHYSICAL EXAMINATION:
VITALS:   T(C): 36.6 (06-08-24 @ 04:40), Max: 36.6 (06-08-24 @ 04:40)  HR: 105 (06-08-24 @ 10:14) (86 - 121)  BP: 105/68 (06-08-24 @ 04:40) (92/71 - 105/68)  RR: 20 (06-08-24 @ 10:14) (19 - 20)  SpO2: 99% (06-08-24 @ 10:14) (94% - 100%)    GENERAL: NAD, lying in bed comfortably  HEAD:  Atraumatic, Normocephalic  EYES: EOMI, PERRLA, conjunctiva and sclera clear  ENT: Moist mucous membranes  NECK: Supple, No JVD  CHEST/LUNG: Clear to auscultation bilaterally; No rales, rhonchi, wheezing, or rubs. Unlabored respirations  HEART: Regular rate and rhythm; No murmurs, rubs, or gallops  ABDOMEN: BSx4; Soft, nontender, nondistended  EXTREMITIES:  2+ Peripheral Pulses, brisk capillary refill. No clubbing, cyanosis, or edema  NERVOUS SYSTEM:  A&Ox3, no focal deficits   SKIN: No rashes or lesions  PSYCH: Normal affect, euthymic mood

## 2024-06-08 NOTE — DISCHARGE NOTE NURSING/CASE MANAGEMENT/SOCIAL WORK - PATIENT PORTAL LINK FT
You can access the FollowMyHealth Patient Portal offered by [facility] by registering at the following website: [email or web address]. By joining [facility]’s FollowMyHealth portal, you will also be able to view your health information using other applications (apps) compatible with our system.

## 2024-06-08 NOTE — DIETITIAN INITIAL EVALUATION ADULT - NSFNSPHYEXAMSKINFT_GEN_A_CORE
Pressure Injury 1: Left:, heel, Stage I  Pressure Injury 2: midline, sacrum, Stage I  Pressure Injury 3: Left:, sacrum, Stage II  Pressure Injury 4: medial, coccyx, Stage I  Pressure Injury 5: Left:, coccyx, Suspected deep tissue injury  Pressure Injury 6: Right:, elbow, Stage III

## 2024-06-08 NOTE — PROGRESS NOTE ADULT - ASSESSMENT
89F wtih PMHX CVA with residual L sided weakness, AFIB, Hypothyroidism, DM2, Chronic Pain, Seizure Disorder presents to [facility] ER for AMS and Lethargy. Pt dx with recent UTI on 5/25/24 placed on abx (unclear abx but drfirst showed Nitrofurantoin rx). Over the past few days has becomign increasingly confused, somnolent, and hallucinating. Decreased PO intake. Noted to be hypotensive and hypothermic on arrival. Tachycardic on monitor showing AFIB RVR. Given Lopressor 5mg IV and Diltiazem 10mg IV x1 with improvement but developed hypotension. Given 2.5L IVFB LR in ED however recurrently hypotensive. Lactate 6.8 in ED. CTA Chest/Abd/Pelvis with multiple findings. Found to have MELCHOR and started on Heparin gtt. BL moderate-large pleural effusions. IVF lowered and CT Surgery Consulted for possible thora. BL LL infiltrates suspicious for PNA, Stercoral Colitis, and UA with Acute UTI. Given Ceftriaxone/Zosyn/Vanco for empiric IV ABX. Pt seen/examined. Placed on shahriar hugger. IVF and IV ABX adjusted. Very guarded prognosis. Full Code per ED provider discussed with family.     Severe CM with b/l pleural effusion, EF < 20 %   Severe Sepsis ( Present on admission) 2/2 Multiple Sources of Infection including Acute UTI, Bibasilar PNA, Stercoral Colitis  Atrial FIB / MELCHOR thrombus ?   AMS 2/2 Toxic Metabolic Encephalopathy due to infection   DM2    GOC resulted in comfort   IV Morphine, zofran prn  DNR/ DNI , no feeding tube   Cont antiepileptics

## 2024-06-08 NOTE — DISCHARGE NOTE NURSING/CASE MANAGEMENT/SOCIAL WORK - NSDCPEFALRISK_GEN_ALL_CORE
For information on Fall & Injury Prevention, visit: [email or web address] OR  [email or web address] OR  https://www.cdc.gov/steadi/patient.html
Expected Date Of Service: 01/05/2022
Performing Laboratory: 0
Billing Type: Third-Party Bill
Bill For Surgical Tray: no

## 2024-06-08 NOTE — DISCHARGE NOTE PROVIDER - HOSPITAL COURSE
89F wtih PMHX CVA with residual L sided weakness, AFIB, Hypothyroidism, DM2, Chronic Pain, Seizure Disorder presents to [facility] ER for AMS and Lethargy.     Severe CM with b/l pleural effusion, EF < 20 %   Severe Sepsis ( Present on admission) 2/2 Multiple Sources of Infection including Acute UTI, Bibasilar PNA, Stercoral Colitis  Atrial FIB / MELCHOR thrombus ?   AMS 2/2 Toxic Metabolic Encephalopathy due to infection   DM2    GOC resulted in comfort measures  IV Morphine, zofran prn  DNR/ DNI , no feeding tube   Cont antiepileptics

## 2024-06-09 ENCOUNTER — TRANSCRIPTION ENCOUNTER (OUTPATIENT)
Age: 89
End: 2024-06-09

## 2024-06-10 LAB
CULTURE RESULTS: SIGNIFICANT CHANGE UP
CULTURE RESULTS: SIGNIFICANT CHANGE UP
SPECIMEN SOURCE: SIGNIFICANT CHANGE UP
SPECIMEN SOURCE: SIGNIFICANT CHANGE UP

## 2024-09-03 NOTE — ED CDU PROVIDER SUBSEQUENT DAY NOTE - CONSTITUTIONAL [-], MLM
Post procedure with Dr. Nicole 8-30-24   - Drain to remain to gravity for 24h and then capped  (turn the stop-cock); if tolerated the patient will   return to our IR team (needs to be scheduled) within  2w if possible for externalization with a small   catheter and if this is tolerated the drain may be  removed; IF the patient fails any of these steps we     will have her return for placement of a covered metal stent, either through GI/IR combination or by IR alone     Called to check on patient post procedure. Left message  Patient called back, drain is capped., has been capped since Sunday 9/1/24. Tolerating capping, no issues for now. Has been in contact with IR from  [facility] from [facility] last week, new referral placed for [facility] IR. Patient knows IR will reach out to her about next steps and she will call with additional questions/concerns.          ML  
no chills/no fever

## 2024-09-29 PROCEDURE — 85027 COMPLETE CBC AUTOMATED: CPT

## 2024-09-29 PROCEDURE — 80053 COMPREHEN METABOLIC PANEL: CPT

## 2024-09-29 PROCEDURE — 87641 MR-STAPH DNA AMP PROBE: CPT

## 2024-09-29 PROCEDURE — 83605 ASSAY OF LACTIC ACID: CPT

## 2024-09-29 PROCEDURE — 93005 ELECTROCARDIOGRAM TRACING: CPT

## 2024-09-29 PROCEDURE — 82947 ASSAY GLUCOSE BLOOD QUANT: CPT

## 2024-09-29 PROCEDURE — 82803 BLOOD GASES ANY COMBINATION: CPT

## 2024-09-29 PROCEDURE — 85610 PROTHROMBIN TIME: CPT

## 2024-09-29 PROCEDURE — 85025 COMPLETE CBC W/AUTO DIFF WBC: CPT

## 2024-09-29 PROCEDURE — 82962 GLUCOSE BLOOD TEST: CPT

## 2024-09-29 PROCEDURE — 87640 STAPH A DNA AMP PROBE: CPT

## 2024-09-29 PROCEDURE — 96361 HYDRATE IV INFUSION ADD-ON: CPT

## 2024-09-29 PROCEDURE — C8929: CPT

## 2024-09-29 PROCEDURE — 96365 THER/PROPH/DIAG IV INF INIT: CPT

## 2024-09-29 PROCEDURE — 83036 HEMOGLOBIN GLYCOSYLATED A1C: CPT

## 2024-09-29 PROCEDURE — 84484 ASSAY OF TROPONIN QUANT: CPT

## 2024-09-29 PROCEDURE — 84132 ASSAY OF SERUM POTASSIUM: CPT

## 2024-09-29 PROCEDURE — 85018 HEMOGLOBIN: CPT

## 2024-09-29 PROCEDURE — 87899 AGENT NOS ASSAY W/OPTIC: CPT

## 2024-09-29 PROCEDURE — 96367 TX/PROPH/DG ADDL SEQ IV INF: CPT

## 2024-09-29 PROCEDURE — 36415 COLL VENOUS BLD VENIPUNCTURE: CPT

## 2024-09-29 PROCEDURE — 87086 URINE CULTURE/COLONY COUNT: CPT

## 2024-09-29 PROCEDURE — 71260 CT THORAX DX C+: CPT | Mod: MC

## 2024-09-29 PROCEDURE — 82435 ASSAY OF BLOOD CHLORIDE: CPT

## 2024-09-29 PROCEDURE — 84443 ASSAY THYROID STIM HORMONE: CPT

## 2024-09-29 PROCEDURE — 85014 HEMATOCRIT: CPT

## 2024-09-29 PROCEDURE — 87449 NOS EACH ORGANISM AG IA: CPT

## 2024-09-29 PROCEDURE — 70450 CT HEAD/BRAIN W/O DYE: CPT | Mod: MC

## 2024-09-29 PROCEDURE — 87040 BLOOD CULTURE FOR BACTERIA: CPT

## 2024-09-29 PROCEDURE — 99285 EMERGENCY DEPT VISIT HI MDM: CPT | Mod: 25

## 2024-09-29 PROCEDURE — 81001 URINALYSIS AUTO W/SCOPE: CPT

## 2024-09-29 PROCEDURE — 84100 ASSAY OF PHOSPHORUS: CPT

## 2024-09-29 PROCEDURE — 83735 ASSAY OF MAGNESIUM: CPT

## 2024-09-29 PROCEDURE — 85730 THROMBOPLASTIN TIME PARTIAL: CPT

## 2024-09-29 PROCEDURE — 74177 CT ABD & PELVIS W/CONTRAST: CPT | Mod: MC

## 2024-09-29 PROCEDURE — 82330 ASSAY OF CALCIUM: CPT

## 2024-09-29 PROCEDURE — 84295 ASSAY OF SERUM SODIUM: CPT

## 2024-09-29 PROCEDURE — 96375 TX/PRO/DX INJ NEW DRUG ADDON: CPT

## 2025-04-02 NOTE — DIETITIAN NUTRITION RISK NOTIFICATION - MUSCLE MASS (LOSS OF MUSCLE)
[Care Plan reviewed and provided to patient/caregiver] : Care plan reviewed and provided to patient/caregiver [Care Plan reviewed every ___ weeks] : Care plan reviewed every [unfilled] weeks [Understands and communicates without difficulty] : Patient/Caregiver understands and communicates without difficulty Temples.../Clavicles...